# Patient Record
Sex: MALE | Race: WHITE | NOT HISPANIC OR LATINO | Employment: FULL TIME | URBAN - METROPOLITAN AREA
[De-identification: names, ages, dates, MRNs, and addresses within clinical notes are randomized per-mention and may not be internally consistent; named-entity substitution may affect disease eponyms.]

---

## 2017-12-11 ENCOUNTER — OFFICE VISIT (OUTPATIENT)
Dept: URGENT CARE | Facility: PHYSICIAN GROUP | Age: 42
End: 2017-12-11
Payer: COMMERCIAL

## 2017-12-11 VITALS
HEIGHT: 72 IN | DIASTOLIC BLOOD PRESSURE: 90 MMHG | HEART RATE: 89 BPM | WEIGHT: 201 LBS | OXYGEN SATURATION: 95 % | BODY MASS INDEX: 27.22 KG/M2 | TEMPERATURE: 98.6 F | SYSTOLIC BLOOD PRESSURE: 120 MMHG | RESPIRATION RATE: 18 BRPM

## 2017-12-11 DIAGNOSIS — R05.8 PRODUCTIVE COUGH: ICD-10-CM

## 2017-12-11 PROCEDURE — 99214 OFFICE O/P EST MOD 30 MIN: CPT | Performed by: NURSE PRACTITIONER

## 2017-12-11 ASSESSMENT — ENCOUNTER SYMPTOMS
SORE THROAT: 0
CHILLS: 0
COUGH: 1
WEAKNESS: 1
NAUSEA: 0
DIARRHEA: 0
VOMITING: 0
FEVER: 0
WHEEZING: 0
MYALGIAS: 0
SPUTUM PRODUCTION: 1
RHINORRHEA: 0
SHORTNESS OF BREATH: 0

## 2017-12-11 ASSESSMENT — COPD QUESTIONNAIRES: COPD: 0

## 2017-12-12 NOTE — PROGRESS NOTES
Subjective:      Donovan Galeas is a 42 y.o. male who presents with Cough (congestion x 2 days)            Medications, Allergies and Prior Medical Hx reviewed and updated in [facility].with patient/family today           Cough   This is a new problem. The current episode started in the past 7 days (2 days). The problem has been gradually worsening. The cough is productive of sputum. Pertinent negatives include no chills, ear pain, fever, myalgias, rhinorrhea, sore throat, shortness of breath or wheezing. Nothing aggravates the symptoms. He has tried OTC cough suppressant for the symptoms. The treatment provided no relief. There is no history of asthma, COPD or emphysema.       Review of Systems   Constitutional: Positive for malaise/fatigue. Negative for chills and fever.   HENT: Negative for congestion, ear discharge, ear pain, rhinorrhea and sore throat.    Respiratory: Positive for cough and sputum production. Negative for shortness of breath and wheezing.    Gastrointestinal: Negative for diarrhea, nausea and vomiting.   Musculoskeletal: Negative for myalgias.   Neurological: Positive for weakness.          Objective:     /90   Pulse 89   Temp 37 °C (98.6 °F)   Resp 18   Ht 1.829 m (6')   Wt 91.2 kg (201 lb)   SpO2 95%   BMI 27.26 kg/m²      Physical Exam   Constitutional: He appears well-developed and well-nourished. No distress.   HENT:   Head: Normocephalic and atraumatic.   Right Ear: Tympanic membrane and ear canal normal.   Left Ear: Tympanic membrane and ear canal normal.   Nose: No rhinorrhea.   Mouth/Throat: Uvula is midline and mucous membranes are normal. No trismus in the jaw. No uvula swelling. Posterior oropharyngeal erythema present. No oropharyngeal exudate or posterior oropharyngeal edema.   Eyes: Conjunctivae are normal. Pupils are equal, round, and reactive to light.   Neck: Neck supple.   Cardiovascular: Normal rate, regular rhythm and normal heart sounds.    Pulmonary/Chest: Effort  normal and breath sounds normal. No respiratory distress. He has no wheezes.   Lymphadenopathy:     He has cervical adenopathy.   Neurological: He is alert.   Skin: Skin is warm and dry.   Psychiatric: He has a normal mood and affect. His behavior is normal.   Vitals reviewed.              Assessment/Plan:     1. Productive cough  Hydrocod Polst-CPM Polst ER (TUSSIONEX) 10-8 MG/5ML Suspension Extended Release       Do not drink alcohol or operate machinery with this medication  Pt reviewed on [place]  Aware,  no remarkable controlled substance prescription documentation noted      Rest, Fluids, tylenol, ibuprofen, humidified air, and otc cough meds  Pt will go to the ER for worsening or changing symptoms as discussed, follow-up with your primary care provider or return here if not improving in 7 days   Discharge instructions discussed with pt/family who verbalize understanding and agreement with poc

## 2018-04-20 ENCOUNTER — OFFICE VISIT (OUTPATIENT)
Dept: URGENT CARE | Facility: PHYSICIAN GROUP | Age: 43
End: 2018-04-20
Payer: COMMERCIAL

## 2018-04-20 VITALS
TEMPERATURE: 98.7 F | HEIGHT: 72 IN | DIASTOLIC BLOOD PRESSURE: 76 MMHG | BODY MASS INDEX: 26.14 KG/M2 | HEART RATE: 79 BPM | OXYGEN SATURATION: 97 % | RESPIRATION RATE: 16 BRPM | SYSTOLIC BLOOD PRESSURE: 124 MMHG | WEIGHT: 193 LBS

## 2018-04-20 DIAGNOSIS — J06.9 VIRAL URI WITH COUGH: ICD-10-CM

## 2018-04-20 PROCEDURE — 99214 OFFICE O/P EST MOD 30 MIN: CPT | Performed by: PHYSICIAN ASSISTANT

## 2018-04-20 RX ORDER — CODEINE PHOSPHATE AND GUAIFENESIN 10; 100 MG/5ML; MG/5ML
5 SOLUTION ORAL EVERY 4 HOURS PRN
Qty: 120 ML | Refills: 0 | Status: SHIPPED | OUTPATIENT
Start: 2018-04-20 | End: 2018-04-25

## 2018-04-20 ASSESSMENT — ENCOUNTER SYMPTOMS
RHINORRHEA: 1
CARDIOVASCULAR NEGATIVE: 1
NEUROLOGICAL NEGATIVE: 1
WHEEZING: 0
SHORTNESS OF BREATH: 0
FEVER: 0
SINUS PAIN: 0
COUGH: 1
MUSCULOSKELETAL NEGATIVE: 1
CHILLS: 0
GASTROINTESTINAL NEGATIVE: 1
SORE THROAT: 0
SPUTUM PRODUCTION: 1

## 2018-04-20 NOTE — PROGRESS NOTES
Subjective:      Donovan Galeas is a 42 y.o. male who presents with Cough (with sore throat and congestion, not sleeping due to cough x 1 week)            Cough   This is a new problem. The problem has been gradually worsening. The problem occurs every few minutes. The cough is productive of sputum. Associated symptoms include nasal congestion and rhinorrhea. Pertinent negatives include no chills, ear pain, fever, postnasal drip, sore throat, shortness of breath or wheezing. The symptoms are aggravated by lying down. He has tried OTC cough suppressant for the symptoms. The treatment provided mild relief. His past medical history is significant for environmental allergies. There is no history of asthma, bronchitis or pneumonia.       PMH:  has a past medical history of Kidney calculi and Plantar fasciitis.  MEDS:   Current Outpatient Prescriptions:   •  FENOFIBRATE PO, Take  by mouth., Disp: , Rfl:   •  Hydrocod Polst-CPM Polst ER (TUSSIONEX) 10-8 MG/5ML Suspension Extended Release, Take 5 mL by mouth every 12 hours., Disp: 120 mL, Rfl: 0  •  azithromycin (ZITHROMAX) 250 MG Tab, Take as directed, Disp: 6 Tab, Rfl: 0  •  azithromycin (ZITHROMAX) 250 MG Tab, Take as directed on package. 1 pack, Disp: 6 Tab, Rfl: 0  •  azithromycin (ZITHROMAX) 250 MG TABS, 2 tabs by mouth day 1, 1 tab by mouth days 2-5, Disp: 6 Tab, Rfl: 0  •  albuterol (VENTOLIN OR PROVENTIL) 108 (90 BASE) MCG/ACT AERS, Inhale 2 Puffs by mouth every four hours as needed for Shortness of Breath., Disp: 1 Inhaler, Rfl: 0  •  hydrocod polst-chlorphen polst (TUSSIONEX PENNKINETIC ER) 10-8 MG/5ML LQCR, Take 5 mL by mouth every 12 hours., Disp: 120 mL, Rfl: 0  •  METHOCARBAMOL PO, Take  by mouth.  , Disp: , Rfl:   •  Hydrocodone-Acetaminophen (VICODIN PO), Take  by mouth.  , Disp: , Rfl:   •  NON SPECIFIED, Antibiotic for kidney stone , Disp: , Rfl:   ALLERGIES: No Known Allergies  SURGHX: No past surgical history on file.  SOCHX:  reports that he has never  smoked. He has never used smokeless tobacco.  FH: family history is not on file.      Review of Systems   Constitutional: Negative for chills and fever.   HENT: Positive for congestion and rhinorrhea. Negative for ear pain, postnasal drip, sinus pain and sore throat.    Respiratory: Positive for cough and sputum production. Negative for shortness of breath and wheezing.    Cardiovascular: Negative.    Gastrointestinal: Negative.    Musculoskeletal: Negative.    Neurological: Negative.    Endo/Heme/Allergies: Positive for environmental allergies.       Medications, Allergies, and current problem list reviewed today in Epic     Objective:     /76   Pulse 79   Temp 37.1 °C (98.7 °F)   Resp 16   Ht 1.829 m (6')   Wt 87.5 kg (193 lb)   SpO2 97%   BMI 26.18 kg/m²      Physical Exam   Constitutional: He is oriented to person, place, and time. He appears well-developed and well-nourished. No distress.   HENT:   Head: Normocephalic and atraumatic.   Right Ear: Tympanic membrane and external ear normal.   Left Ear: Tympanic membrane and external ear normal.   Nose: Nose normal.   Mouth/Throat: Oropharynx is clear and moist. No oropharyngeal exudate.   Eyes: Conjunctivae and EOM are normal. Pupils are equal, round, and reactive to light. Right eye exhibits no discharge. Left eye exhibits no discharge.   Neck: Normal range of motion. Neck supple.   Cardiovascular: Normal rate, regular rhythm and normal heart sounds.    No murmur heard.  Pulmonary/Chest: Effort normal and breath sounds normal. No respiratory distress. He has no wheezes. He exhibits no tenderness.   Lymphadenopathy:     He has no cervical adenopathy.   Neurological: He is alert and oriented to person, place, and time.   Skin: Skin is warm and dry. He is not diaphoretic.   Psychiatric: He has a normal mood and affect. His behavior is normal. Judgment and thought content normal.   Nursing note and vitals reviewed.              Assessment/Plan:     1.  Viral URI with cough  guaifenesin-codeine (ROBITUSSIN AC) Solution oral solution     Persistent nighttime cough. Not sleeping well. No signs bacterial infection, PO2 adequate.  [facility] Aware web site evaluation: I have obtained and reviewed patient utilization report from [facility] pharmacy database prior to writing prescription for controlled substance II, III or IV. Based on the report and my clinical assessment the prescription is medically necessary.   Patient is cautioned on sedation potential of narcotic medication; no drinking, driving or operating heavy machinery while on this medication.  OTC meds and conservative measures as discussed    Return to clinic or go to ED if symptoms worsen or persist. Indications for ED discussed at length. Patient voices understanding. Follow-up with your primary care provider in 3-5 days. Red flags discussed. All side effects of medication discussed including allergic response, GI upset, tendon injury, etc.    Please note that this dictation was created using voice recognition software. I have made every reasonable attempt to correct obvious errors, but I expect that there are errors of grammar and possibly content that I did not discover before finalizing the note.

## 2018-10-18 ENCOUNTER — APPOINTMENT (OUTPATIENT)
Dept: ADMISSIONS | Facility: MEDICAL CENTER | Age: 43
End: 2018-10-18
Payer: COMMERCIAL

## 2018-10-19 DIAGNOSIS — Z01.812 PRE-OPERATIVE LABORATORY EXAMINATION: ICD-10-CM

## 2018-10-19 LAB
ANION GAP SERPL CALC-SCNC: 8 MMOL/L (ref 0–11.9)
BUN SERPL-MCNC: 25 MG/DL (ref 8–22)
CALCIUM SERPL-MCNC: 10.2 MG/DL (ref 8.5–10.5)
CHLORIDE SERPL-SCNC: 103 MMOL/L (ref 96–112)
CO2 SERPL-SCNC: 28 MMOL/L (ref 20–33)
CREAT SERPL-MCNC: 1.23 MG/DL (ref 0.5–1.4)
GLUCOSE SERPL-MCNC: 95 MG/DL (ref 65–99)
POTASSIUM SERPL-SCNC: 4.2 MMOL/L (ref 3.6–5.5)
SODIUM SERPL-SCNC: 139 MMOL/L (ref 135–145)

## 2018-10-19 PROCEDURE — 80048 BASIC METABOLIC PNL TOTAL CA: CPT

## 2018-10-19 PROCEDURE — 36415 COLL VENOUS BLD VENIPUNCTURE: CPT

## 2018-10-19 RX ORDER — CHLORAL HYDRATE 500 MG
1000 CAPSULE ORAL DAILY
Status: ON HOLD | COMMUNITY
End: 2018-10-26

## 2018-10-19 RX ORDER — MULTIVIT WITH MINERALS/LUTEIN
1 TABLET ORAL DAILY
COMMUNITY
End: 2021-07-06

## 2018-10-19 RX ORDER — CYANOCOBALAMIN (VITAMIN B-12) 5000 MCG
1 TABLET,DISINTEGRATING ORAL DAILY
COMMUNITY
End: 2021-07-06

## 2018-10-19 RX ORDER — ANTIARTHRITIC COMBINATION NO.2 900 MG
25 TABLET ORAL DAILY
COMMUNITY
End: 2021-07-06

## 2018-10-19 RX ORDER — FENOFIBRATE 160 MG/1
160 TABLET ORAL DAILY
COMMUNITY

## 2018-10-19 RX ORDER — HYDROCORTISONE 5 MG/1
5 TABLET ORAL DAILY
COMMUNITY
End: 2021-07-06

## 2018-10-19 RX ORDER — TESTOSTERONE CYPIONATE 200 MG/ML
150 INJECTION, SOLUTION INTRAMUSCULAR ONCE
COMMUNITY

## 2018-10-19 NOTE — OR NURSING
"Pre-admit appointment completed. \"Preparing for your procedure\" sheet given to pt along with verbal and written instructions. Pt instructed to continue regularly prescribed medications through the day before surgery. Pt instructed to take the following medications the day of surgery with a sip of water, per anesthesia protocol; hydrocortisone and thyroid.  "

## 2018-10-26 ENCOUNTER — HOSPITAL ENCOUNTER (OUTPATIENT)
Facility: MEDICAL CENTER | Age: 43
End: 2018-10-26
Attending: OTOLARYNGOLOGY | Admitting: OTOLARYNGOLOGY
Payer: COMMERCIAL

## 2018-10-26 VITALS
HEIGHT: 72 IN | SYSTOLIC BLOOD PRESSURE: 124 MMHG | BODY MASS INDEX: 26.9 KG/M2 | WEIGHT: 198.63 LBS | HEART RATE: 70 BPM | DIASTOLIC BLOOD PRESSURE: 76 MMHG | RESPIRATION RATE: 16 BRPM | OXYGEN SATURATION: 96 % | TEMPERATURE: 97.2 F

## 2018-10-26 DIAGNOSIS — Z90.89 POST-TONSILLECTOMY PAIN: ICD-10-CM

## 2018-10-26 DIAGNOSIS — G89.18 POST-TONSILLECTOMY PAIN: ICD-10-CM

## 2018-10-26 LAB — PATHOLOGY CONSULT NOTE: NORMAL

## 2018-10-26 PROCEDURE — 700102 HCHG RX REV CODE 250 W/ 637 OVERRIDE(OP): Performed by: ANESTHESIOLOGY

## 2018-10-26 PROCEDURE — 501424 HCHG SPONGE, TONSIL: Performed by: OTOLARYNGOLOGY

## 2018-10-26 PROCEDURE — 700111 HCHG RX REV CODE 636 W/ 250 OVERRIDE (IP)

## 2018-10-26 PROCEDURE — A9270 NON-COVERED ITEM OR SERVICE: HCPCS | Performed by: ANESTHESIOLOGY

## 2018-10-26 PROCEDURE — 160036 HCHG PACU - EA ADDL 30 MINS PHASE I: Performed by: OTOLARYNGOLOGY

## 2018-10-26 PROCEDURE — 700101 HCHG RX REV CODE 250

## 2018-10-26 PROCEDURE — 88304 TISSUE EXAM BY PATHOLOGIST: CPT

## 2018-10-26 PROCEDURE — 160025 RECOVERY II MINUTES (STATS): Performed by: OTOLARYNGOLOGY

## 2018-10-26 PROCEDURE — 160002 HCHG RECOVERY MINUTES (STAT): Performed by: OTOLARYNGOLOGY

## 2018-10-26 PROCEDURE — 501838 HCHG SUTURE GENERAL: Performed by: OTOLARYNGOLOGY

## 2018-10-26 PROCEDURE — 160027 HCHG SURGERY MINUTES - 1ST 30 MINS LEVEL 2: Performed by: OTOLARYNGOLOGY

## 2018-10-26 PROCEDURE — 160048 HCHG OR STATISTICAL LEVEL 1-5: Performed by: OTOLARYNGOLOGY

## 2018-10-26 PROCEDURE — 160046 HCHG PACU - 1ST 60 MINS PHASE II: Performed by: OTOLARYNGOLOGY

## 2018-10-26 PROCEDURE — 160009 HCHG ANES TIME/MIN: Performed by: OTOLARYNGOLOGY

## 2018-10-26 PROCEDURE — 500257: Performed by: OTOLARYNGOLOGY

## 2018-10-26 PROCEDURE — 160035 HCHG PACU - 1ST 60 MINS PHASE I: Performed by: OTOLARYNGOLOGY

## 2018-10-26 PROCEDURE — 700101 HCHG RX REV CODE 250: Performed by: OTOLARYNGOLOGY

## 2018-10-26 RX ORDER — DIPHENHYDRAMINE HYDROCHLORIDE 50 MG/ML
12.5 INJECTION INTRAMUSCULAR; INTRAVENOUS
Status: DISCONTINUED | OUTPATIENT
Start: 2018-10-26 | End: 2018-10-26 | Stop reason: HOSPADM

## 2018-10-26 RX ORDER — HYDROMORPHONE HYDROCHLORIDE 2 MG/ML
0.2 INJECTION, SOLUTION INTRAMUSCULAR; INTRAVENOUS; SUBCUTANEOUS
Status: DISCONTINUED | OUTPATIENT
Start: 2018-10-26 | End: 2018-10-26 | Stop reason: HOSPADM

## 2018-10-26 RX ORDER — OXYCODONE AND ACETAMINOPHEN 10; 325 MG/1; MG/1
1 TABLET ORAL EVERY 4 HOURS PRN
Qty: 40 TAB | Refills: 0 | Status: SHIPPED | OUTPATIENT
Start: 2018-10-26 | End: 2018-11-05

## 2018-10-26 RX ORDER — HYDROMORPHONE HYDROCHLORIDE 2 MG/ML
0.4 INJECTION, SOLUTION INTRAMUSCULAR; INTRAVENOUS; SUBCUTANEOUS
Status: DISCONTINUED | OUTPATIENT
Start: 2018-10-26 | End: 2018-10-26 | Stop reason: HOSPADM

## 2018-10-26 RX ORDER — BUPIVACAINE HYDROCHLORIDE AND EPINEPHRINE 2.5; 5 MG/ML; UG/ML
INJECTION, SOLUTION EPIDURAL; INFILTRATION; INTRACAUDAL; PERINEURAL
Status: DISCONTINUED | OUTPATIENT
Start: 2018-10-26 | End: 2018-10-26 | Stop reason: HOSPADM

## 2018-10-26 RX ORDER — ONDANSETRON 2 MG/ML
4 INJECTION INTRAMUSCULAR; INTRAVENOUS EVERY 4 HOURS PRN
Status: DISCONTINUED | OUTPATIENT
Start: 2018-10-26 | End: 2018-10-26 | Stop reason: HOSPADM

## 2018-10-26 RX ORDER — CEFDINIR 250 MG/5ML
300 POWDER, FOR SUSPENSION ORAL 2 TIMES DAILY
Qty: 84 ML | Refills: 0 | Status: SHIPPED | OUTPATIENT
Start: 2018-10-26 | End: 2021-07-06

## 2018-10-26 RX ORDER — HYDROMORPHONE HYDROCHLORIDE 2 MG/ML
0.1 INJECTION, SOLUTION INTRAMUSCULAR; INTRAVENOUS; SUBCUTANEOUS
Status: DISCONTINUED | OUTPATIENT
Start: 2018-10-26 | End: 2018-10-26 | Stop reason: HOSPADM

## 2018-10-26 RX ORDER — OXYCODONE HYDROCHLORIDE 10 MG/1
10 TABLET ORAL
Status: DISCONTINUED | OUTPATIENT
Start: 2018-10-26 | End: 2018-10-26 | Stop reason: HOSPADM

## 2018-10-26 RX ORDER — OXYCODONE HCL 5 MG/5 ML
10 SOLUTION, ORAL ORAL
Status: COMPLETED | OUTPATIENT
Start: 2018-10-26 | End: 2018-10-26

## 2018-10-26 RX ORDER — SODIUM CHLORIDE, SODIUM LACTATE, POTASSIUM CHLORIDE, CALCIUM CHLORIDE 600; 310; 30; 20 MG/100ML; MG/100ML; MG/100ML; MG/100ML
INJECTION, SOLUTION INTRAVENOUS CONTINUOUS
Status: DISCONTINUED | OUTPATIENT
Start: 2018-10-26 | End: 2018-10-26 | Stop reason: HOSPADM

## 2018-10-26 RX ORDER — ONDANSETRON 2 MG/ML
4 INJECTION INTRAMUSCULAR; INTRAVENOUS
Status: DISCONTINUED | OUTPATIENT
Start: 2018-10-26 | End: 2018-10-26 | Stop reason: HOSPADM

## 2018-10-26 RX ORDER — OXYCODONE HCL 5 MG/5 ML
5 SOLUTION, ORAL ORAL
Status: COMPLETED | OUTPATIENT
Start: 2018-10-26 | End: 2018-10-26

## 2018-10-26 RX ORDER — HALOPERIDOL 5 MG/ML
1 INJECTION INTRAMUSCULAR
Status: DISCONTINUED | OUTPATIENT
Start: 2018-10-26 | End: 2018-10-26 | Stop reason: HOSPADM

## 2018-10-26 RX ORDER — SODIUM CHLORIDE, SODIUM LACTATE, POTASSIUM CHLORIDE, CALCIUM CHLORIDE 600; 310; 30; 20 MG/100ML; MG/100ML; MG/100ML; MG/100ML
INJECTION, SOLUTION INTRAVENOUS ONCE
Status: COMPLETED | OUTPATIENT
Start: 2018-10-26 | End: 2018-10-26

## 2018-10-26 RX ORDER — OXYCODONE HYDROCHLORIDE 5 MG/1
5 TABLET ORAL
Status: DISCONTINUED | OUTPATIENT
Start: 2018-10-26 | End: 2018-10-26 | Stop reason: HOSPADM

## 2018-10-26 RX ADMIN — LIDOCAINE HYDROCHLORIDE 0.5 ML: 10 INJECTION, SOLUTION INFILTRATION; PERINEURAL at 06:30

## 2018-10-26 RX ADMIN — OXYCODONE HYDROCHLORIDE 10 MG: 5 SOLUTION ORAL at 08:49

## 2018-10-26 RX ADMIN — SODIUM CHLORIDE, SODIUM LACTATE, POTASSIUM CHLORIDE, CALCIUM CHLORIDE: 600; 310; 30; 20 INJECTION, SOLUTION INTRAVENOUS at 06:30

## 2018-10-26 ASSESSMENT — PAIN SCALES - GENERAL
PAINLEVEL_OUTOF10: ASSUMED PAIN PRESENT
PAINLEVEL_OUTOF10: ASSUMED PAIN PRESENT
PAINLEVEL_OUTOF10: 4
PAINLEVEL_OUTOF10: ASSUMED PAIN PRESENT
PAINLEVEL_OUTOF10: 4

## 2018-10-26 NOTE — OR NURSING
"1639: Pt's SO, Vale, called related to pt feeling increased anxiety and his feeling that he is having more swelling of his uvula, and that he has a hard time breathing when he lays down. They did say they had called Dr Sy's office \"right before we called you\". Told Vale that he should not lay down, unless he was propped up on 3-4 pillows or was in a recliner, related that airway obstruction is more common if lying supine, explained about tongue muscle relaxing and obstructing airway. Had her check his cap refill, it was \"less than 1 second\". Explained that was a good sign for oxygenation, that if his O2 level was low his finger would not pink back up, she stated that his finger nail beds were not blue. Told her to wait another 30-45 minutes for a call back from Dr Sy's office, that someone was indeed on call, but it might take a little longer for a response related to the holiday. I did tell her that if he stopped breathing, or if his breathing became worse she should call 911. I also told her to call us back if she had not heard from Dr Sy's within 45 minutes.  "

## 2018-10-26 NOTE — OR NURSING
0822 Report from Michelle HARKINS to assume care of patient.  0840 Patient complains of some pain to throat.   0849 Patient medicated orally for pain.  0855 Patients family notified of patients disposition.  0905 Report to Homer HARKINS to assume care of patient.

## 2018-10-26 NOTE — OP REPORT
DATE OF SERVICE:  10/26/2018    PREOPERATIVE DIAGNOSIS:  Tonsillitis.    POSTOPERATIVE DIAGNOSIS:  Tonsillitis.    OPERATION PERFORMED:  Tonsillectomy.    SURGEON:  Rochelle Sy MD    ANESTHESIOLOGIST:  Antwon Arguelles MD    ANESTHESIA:  General endotracheal.    NARRATIVE:  After meeting the patient in preoperative holding area, discussing   risks, benefits, complications, and alternatives, patient was then taken to   the operating room and placed under satisfactory general anesthesia.  Once   that was achieved, eyes were taped shut, head drapes applied, and head of bed   was rotated left 90 degrees.  McIvor mouth gag with L3 handle was then placed   in the oral cavity and palate palpated for any submucous clefting or   diastasis, none was appreciated.  The right tonsil was grasped, retracted   medially, and dissected out of its fossa preserving the palatoglossus and   palatopharyngeus.  After that was completed, any additional bleeding, suction   cautery was used to stop it.  Identical procedure was carried out on the left   hand side.  Once that was achieved, red Ann catheter was intubated on the   left and right naris through the soft palate to suspend it.  There was no   adenoid pad visualization of the torus tubarius bilaterally was seen.  He was   then de-suspended and looked for any evidence of any bleeding, none was seen.    So, he was injected with 0.25% Marcaine with epinephrine 1:200,000,   approximately 1.2 mL and then 2 interrupted 4-0 Vicryls were then used at the   apices of the tonsils in both sides.  The patient was suctioned out,   hypopharyngeal pack was removed.  The patient was awakened, extubated, and   transferred to recovery.    ESTIMATED BLOOD LOSS:  20 mL.       ____________________________________     MD RAIN FRANCOIS / NTS    DD:  10/26/2018 11:16:48  DT:  10/26/2018 12:17:34    D#:  [id number]  Job#:  [id number]

## 2018-10-26 NOTE — OR NURSING
0748 To PACU from OR via gurney, sleeping, respirations spontaneous and non-labored via OPA. VSS.   0800 No change. VSS   0805 OPA dc'd at this time. VSS. Pt denies pain and nausea.   0815 VSS. Pt sleeping. Report to TORIN Pelaez who assumed care of pt.

## 2018-10-26 NOTE — DISCHARGE INSTRUCTIONS
ACTIVITY: Rest and take it easy for the first 24 hours.  A responsible adult is recommended to remain with you during that time.  It is normal to feel sleepy.  We encourage you to not do anything that requires balance, judgment or coordination.    MILD FLU-LIKE SYMPTOMS ARE NORMAL. YOU MAY EXPERIENCE GENERALIZED MUSCLE ACHES, THROAT IRRITATION, HEADACHE AND/OR SOME NAUSEA.    FOR 24 HOURS DO NOT:  Drive, operate machinery or run household appliances.  Drink beer or alcoholic beverages.   Make important decisions or sign legal documents.    SPECIAL INSTRUCTIONS: Sleep/rest with head elevated at least 15-30 degrees (ie well propped up with pillows in bed or in recliner chair)    DIET: To avoid nausea, slowly advance diet as tolerated, avoiding spicy or greasy foods for the first day.  Add more substantial food to your diet according to your physician's instructions.  Babies can be fed formula or breast milk as soon as they are hungry.  INCREASE FLUIDS AND FIBER TO AVOID CONSTIPATION.        FOLLOW-UP APPOINTMENT:  A follow-up appointment should be arranged with your doctor; call to schedule.    You should CALL YOUR PHYSICIAN if you develop:  Fever greater than 101 degrees F.  Pain not relieved by medication, or persistent nausea or vomiting.  Excessive bleeding (blood soaking through dressing) or unexpected drainage from the wound.  Extreme redness or swelling around the incision site, drainage of pus or foul smelling drainage.  Inability to urinate or empty your bladder within 8 hours.  Problems with breathing or chest pain.    You should call 911 if you develop problems with breathing or chest pain.  If you are unable to contact your doctor or surgical center, you should go to the nearest emergency room or urgent care center.  Physician's telephone #: [phone]    If any questions arise, call your doctor.  If your doctor is not available, please feel free to call the Surgical Center at [phone].  The Center is  open Monday through Friday from 7AM to 7PM.  You can also call the HEALTH HOTLINE open 24 hours/day, 7 days/week and speak to a nurse at [phone], or toll free at [phone].    A registered nurse may call you a few days after your surgery to see how you are doing after your procedure.    MEDICATIONS: Resume taking daily medication.  Take prescribed pain medication with food.  If no medication is prescribed, you may take non-aspirin pain medication if needed.  PAIN MEDICATION CAN BE VERY CONSTIPATING.  Take a stool softener or laxative such as senokot, pericolace, or milk of magnesia if needed.    Prescription given for Ominicef and Percocet.  Last pain medication given at 0850 AM..    If your physician has prescribed pain medication that includes Acetaminophen (Tylenol), do not take additional Acetaminophen (Tylenol) while taking the prescribed medication.    Depression / Suicide Risk    As you are discharged from this [facility] facility, it is important to learn how to keep safe from harming yourself.    Recognize the warning signs:  · Abrupt changes in personality, positive or negative- including increase in energy   · Giving away possessions  · Change in eating patterns- significant weight changes-  positive or negative  · Change in sleeping patterns- unable to sleep or sleeping all the time   · Unwillingness or inability to communicate  · Depression  · Unusual sadness, discouragement and loneliness  · Talk of wanting to die  · Neglect of personal appearance   · Rebelliousness- reckless behavior  · Withdrawal from people/activities they love  · Confusion- inability to concentrate     If you or a loved one observes any of these behaviors or has concerns about self-harm, here's what you can do:  · Talk about it- your feelings and reasons for harming yourself  · Remove any means that you might use to hurt yourself (examples: pills, rope, extension cords, firearm)  · Get professional help from the  community (Mental Health, Substance Abuse, psychological counseling)  · Do not be alone:Call your Safe Contact- someone whom you trust who will be there for you.  · Call your local CRISIS HOTLINE 094-5846 or 554-219-0656  · Call your local Children's Mobile Crisis Response Team [place] (470) 747-4326 or www.Ujogo  · Call the toll free National Suicide Prevention Hotlines   · National Suicide Prevention Lifeline 637-147-LTAI (9582)  · National Hope Line Network 800-SUICIDE (485-1379)

## 2018-10-26 NOTE — OR NURSING
0910- Patient sitting to chair from stage I area. No distress noted. Patient denies the need for pain intervention at this time.    0935- discharge paperwork and instructions discussed with patient and family with both stating that discharge instructions understood.     0940- Patient discharged to awaiting vehicle via wheelchair.

## 2019-11-21 ENCOUNTER — OFFICE VISIT (OUTPATIENT)
Dept: URGENT CARE | Facility: PHYSICIAN GROUP | Age: 44
End: 2019-11-21
Payer: COMMERCIAL

## 2019-11-21 VITALS
WEIGHT: 190 LBS | TEMPERATURE: 98.5 F | RESPIRATION RATE: 18 BRPM | SYSTOLIC BLOOD PRESSURE: 124 MMHG | HEIGHT: 72 IN | DIASTOLIC BLOOD PRESSURE: 82 MMHG | OXYGEN SATURATION: 95 % | HEART RATE: 91 BPM | BODY MASS INDEX: 25.73 KG/M2

## 2019-11-21 DIAGNOSIS — J06.9 URI WITH COUGH AND CONGESTION: ICD-10-CM

## 2019-11-21 DIAGNOSIS — R05.9 COUGH: ICD-10-CM

## 2019-11-21 DIAGNOSIS — R50.9 FEVER, UNSPECIFIED FEVER CAUSE: ICD-10-CM

## 2019-11-21 LAB
FLUAV+FLUBV AG SPEC QL IA: NEGATIVE
INT CON NEG: NEGATIVE
INT CON POS: POSITIVE

## 2019-11-21 PROCEDURE — 87804 INFLUENZA ASSAY W/OPTIC: CPT | Performed by: NURSE PRACTITIONER

## 2019-11-21 PROCEDURE — 99214 OFFICE O/P EST MOD 30 MIN: CPT | Performed by: NURSE PRACTITIONER

## 2019-11-21 RX ORDER — AMOXICILLIN AND CLAVULANATE POTASSIUM 875; 125 MG/1; MG/1
1 TABLET, FILM COATED ORAL 2 TIMES DAILY
Qty: 14 TAB | Refills: 0 | Status: SHIPPED | OUTPATIENT
Start: 2019-11-21 | End: 2019-11-28

## 2019-11-21 ASSESSMENT — ENCOUNTER SYMPTOMS
FEVER: 1
SHORTNESS OF BREATH: 0
SORE THROAT: 0
CONSTIPATION: 0
DIARRHEA: 0
SPUTUM PRODUCTION: 0
DIZZINESS: 0
ABDOMINAL PAIN: 0
NAUSEA: 0
WHEEZING: 0
WEAKNESS: 0
HEADACHES: 0
VOMITING: 0
CHILLS: 1
SENSORY CHANGE: 0
BACK PAIN: 0
MYALGIAS: 1
SINUS PAIN: 0
ORTHOPNEA: 0
TINGLING: 0
COUGH: 1
PALPITATIONS: 0

## 2019-11-21 NOTE — PROGRESS NOTES
Subjective:      Donovan Galeas is a 44 y.o. male who presents with Cough (fever, sore throat x3days)            HPI  Cough- dry, sore throat. Works for [employer]. Fatigued. Theraflu, cough drops, cough med that is homeopathic x 2 days. Subjective fever. Denies smoking, asthma. Denies SOB, chest tightness, wheeze. No flu vaccine. Vit C, Vit B injections yesterday.     PMH:  has a past medical history of Cold (10/12/2018), Disorder of thyroid, High cholesterol, Plantar fasciitis, and Seasonal allergies.  MEDS:   Current Outpatient Medications:   •  cefdinir (OMNICEF) 250 MG/5ML suspension, Take 6 mL by mouth 2 times a day., Disp: 84 mL, Rfl: 0  •  testosterone cypionate (DEPO-TESTOSTERONE) 200 MG/ML Solution injection, 150 mg by Intramuscular route Once., Disp: , Rfl:   •  Cyanocobalamin (VITAMIN B-12) 5000 MCG TABLET DISPERSIBLE, Take 1 Tab by mouth every day., Disp: , Rfl:   •  MAGNESIUM GLUCONATE PO, Take 165 mg by mouth 2 Times a Day., Disp: , Rfl:   •  Glucosamine-Chondroit-Vit C-Mn (GLUCOSAMINE CHONDR 1500 COMPLX PO), Take 1 Tab by mouth 2 Times a Day., Disp: , Rfl:   •  fenofibrate (TRIGLIDE) 160 MG tablet, Take 160 mg by mouth every day., Disp: , Rfl:   •  Ascorbic Acid (VITAMIN C) 1000 MG Tab, Take 1 Tab by mouth every day., Disp: , Rfl:   •  DHEA 25 MG Tab, Take 25 mg by mouth every day., Disp: , Rfl:   •  Thyroid 97.5 MG Tab, Take 1 Tab by mouth every day., Disp: , Rfl:   •  hydrocortisone (CORTEF) 5 MG Tab, Take 5 mg by mouth every day. Prescribed by doctor to increase energy by Dr Kearns., Disp: , Rfl:   •  Non Formulary Request, Take 4 Drops by mouth every day. ALLERGY DROPS, Disp: , Rfl:   ALLERGIES:   Allergies   Allergen Reactions   • Seasonal Runny Nose and Itching     Sneezing.  Had allergy testing done.     SURGHX:   Past Surgical History:   Procedure Laterality Date   • TONSILLECTOMY AND ADENOIDECTOMY Bilateral 10/26/2018    Procedure: TONSILLECTOMY AND ADENOIDECTOMY;  Surgeon: Rochelle Sy,  M.D.;  Location: SURGERY [facility];  Service: Ent   • DESTRUCT,NEUROLYTIC AGNT,OTHER  2012    T5-T8 nerve ablation   • RELEASE PLANTAR FASCIA Bilateral 2006   • DENTAL EXTRACTION(S)  1993    wisdom teeth     SOCHX:  reports that he has quit smoking. His smoking use included cigars. He smoked 0.00 packs per day for 2.00 years. He has never used smokeless tobacco. He reports current alcohol use. He reports that he does not use drugs.  FH: Family history was reviewed, no pertinent findings to report    Review of Systems   Constitutional: Positive for chills, fever and malaise/fatigue.   HENT: Positive for congestion. Negative for ear pain, sinus pain and sore throat.    Respiratory: Positive for cough. Negative for sputum production, shortness of breath and wheezing.    Cardiovascular: Negative for chest pain, palpitations and orthopnea.   Gastrointestinal: Negative for abdominal pain, constipation, diarrhea, nausea and vomiting.   Musculoskeletal: Positive for myalgias. Negative for back pain.   Skin: Negative for itching and rash.   Neurological: Negative for dizziness, tingling, sensory change, weakness and headaches.   Endo/Heme/Allergies: Negative for environmental allergies.   All other systems reviewed and are negative.         Objective:     /82 (BP Location: Left arm, Patient Position: Sitting, BP Cuff Size: Adult)   Pulse 91   Temp 36.9 °C (98.5 °F) (Temporal)   Resp 18   Ht 1.829 m (6')   Wt 86.2 kg (190 lb)   SpO2 95%   BMI 25.77 kg/m²      Physical Exam  Vitals signs reviewed.   Constitutional:       General: He is not in acute distress.     Appearance: Normal appearance. He is well-developed. He is not ill-appearing, toxic-appearing or diaphoretic.   HENT:      Head: Normocephalic.      Right Ear: Ear canal and external ear normal. A middle ear effusion is present.      Left Ear: Tympanic membrane, ear canal and external ear normal.      Nose: Mucosal edema, congestion and rhinorrhea  present. No nasal tenderness.      Right Turbinates: Enlarged and swollen.      Mouth/Throat:      Lips: Pink.      Mouth: Mucous membranes are dry.      Pharynx: Posterior oropharyngeal erythema present. No pharyngeal swelling, oropharyngeal exudate or uvula swelling.      Tonsils: Swellin on the right. 0 on the left.   Eyes:      General:         Right eye: No discharge.         Left eye: No discharge.      Conjunctiva/sclera: Conjunctivae normal.      Pupils: Pupils are equal, round, and reactive to light.   Neck:      Musculoskeletal: Normal range of motion.   Cardiovascular:      Rate and Rhythm: Normal rate and regular rhythm.   Pulmonary:      Effort: Pulmonary effort is normal. No accessory muscle usage or respiratory distress.      Breath sounds: Normal breath sounds and air entry.   Abdominal:      General: Bowel sounds are normal. There is no distension.      Palpations: Abdomen is soft.      Tenderness: There is no tenderness. There is no guarding or rebound.   Musculoskeletal: Normal range of motion.   Lymphadenopathy:      Cervical: No cervical adenopathy.   Skin:     General: Skin is warm and dry.   Neurological:      Mental Status: He is alert and oriented to person, place, and time.   Psychiatric:         Behavior: Behavior is cooperative.               Declines CXR  [facility] Aware web site evaluation: I have obtained and reviewed patient utilization report from [facility] pharmacy database prior to writing prescription for controlled substance II, III or IV. Based on the report and my clinical assessment the prescription is medically necessary        Assessment/Plan:       1. Fever, unspecified fever cause    - POCT Influenza A/B: NEG    2. Cough    - Hydrocod Polst-CPM Polst ER (TUSSIONEX) 10-8 MG/5ML Suspension Extended Release; Take 5 mL by mouth every 6 hours as needed for Cough or Rhinitis for up to 7 days. Causes drowsiness, do not drive or work while using.  Dispense: 140 mL; Refill:  0    3. URI with cough and congestion    - amoxicillin-clavulanate (AUGMENTIN) 875-125 MG Tab; Take 1 Tab by mouth 2 times a day for 7 days.  Dispense: 14 Tab; Refill: 0  - Hydrocod Polst-CPM Polst ER (TUSSIONEX) 10-8 MG/5ML Suspension Extended Release; Take 5 mL by mouth every 6 hours as needed for Cough or Rhinitis for up to 7 days. Causes drowsiness, do not drive or work while using.  Dispense: 140 mL; Refill: 0  D/c Theraflu  Increase water intake  May use Ibuprofen/Tylenol prn for fever or body aches  Get rest  May use saline nasal spray/flonase prn to flush any nasal congestion   Monitor for fevers, productive cough, SOB, CP, chest tightness- need re-evaluation

## 2021-03-04 ENCOUNTER — OFFICE VISIT (OUTPATIENT)
Dept: URGENT CARE | Facility: PHYSICIAN GROUP | Age: 46
End: 2021-03-04

## 2021-03-04 VITALS
WEIGHT: 205.6 LBS | HEART RATE: 90 BPM | BODY MASS INDEX: 27.85 KG/M2 | OXYGEN SATURATION: 98 % | TEMPERATURE: 97.8 F | HEIGHT: 72 IN | RESPIRATION RATE: 16 BRPM

## 2021-03-04 DIAGNOSIS — R81 GLUCOSURIA: ICD-10-CM

## 2021-03-04 DIAGNOSIS — Z02.4 ENCOUNTER FOR CDL (COMMERCIAL DRIVING LICENSE) EXAM: ICD-10-CM

## 2021-03-04 LAB
APPEARANCE UR: CLEAR
BILIRUB UR STRIP-MCNC: NORMAL MG/DL
COLOR UR AUTO: YELLOW
GLUCOSE BLD-MCNC: 102 MG/DL (ref 70–100)
GLUCOSE UR STRIP.AUTO-MCNC: 100 MG/DL
HBA1C MFR BLD: 4.8 % (ref 0–5.6)
INT CON NEG: NORMAL
INT CON POS: NORMAL
KETONES UR STRIP.AUTO-MCNC: NORMAL MG/DL
LEUKOCYTE ESTERASE UR QL STRIP.AUTO: NORMAL
NITRITE UR QL STRIP.AUTO: NORMAL
PH UR STRIP.AUTO: 5.5 [PH] (ref 5–8)
PROT UR QL STRIP: NORMAL MG/DL
RBC UR QL AUTO: NORMAL
SP GR UR STRIP.AUTO: 1.02
UROBILINOGEN UR STRIP-MCNC: 0.2 MG/DL

## 2021-03-04 PROCEDURE — 83036 HEMOGLOBIN GLYCOSYLATED A1C: CPT | Performed by: FAMILY MEDICINE

## 2021-03-04 PROCEDURE — 7100 PR DOT PHYSICAL: Performed by: FAMILY MEDICINE

## 2021-03-04 PROCEDURE — 81002 URINALYSIS NONAUTO W/O SCOPE: CPT | Performed by: FAMILY MEDICINE

## 2021-03-04 PROCEDURE — 82962 GLUCOSE BLOOD TEST: CPT | Performed by: FAMILY MEDICINE

## 2021-03-04 NOTE — PROGRESS NOTES
Subjective:      Donovan Galeas is a 45 y.o. male who presents with Annual Exam (DOT )  See scanned history, physical  and clearance status in EPIC    Patient denies any history of seizures, dialysis, insulin use, pacemaker/defibrillator, syncope, arrhythmias/murmurs, vertigo/meniere's disease, illicit drug use, regular sedating medication use, ETOH abuse, or any weakness/paresthesias to extremities.     Cleared:  2yrs    Renew: yes    Corrective lenses:  no          HPI    ROS       Objective:     Pulse 90   Temp 36.6 °C (97.8 °F) (Temporal)   Resp 16   Ht 1.829 m (6')   Wt 93.3 kg (205 lb 9.6 oz)   SpO2 98%   BMI 27.88 kg/m²    132/90  Physical Exam            Assessment/Plan:

## 2021-07-06 ENCOUNTER — HOSPITAL ENCOUNTER (OUTPATIENT)
Dept: RADIOLOGY | Facility: MEDICAL CENTER | Age: 46
End: 2021-07-06
Attending: PHYSICIAN ASSISTANT
Payer: COMMERCIAL

## 2021-07-06 ENCOUNTER — OCCUPATIONAL MEDICINE (OUTPATIENT)
Dept: URGENT CARE | Facility: PHYSICIAN GROUP | Age: 46
End: 2021-07-06
Payer: COMMERCIAL

## 2021-07-06 VITALS
OXYGEN SATURATION: 100 % | TEMPERATURE: 98.3 F | WEIGHT: 205 LBS | BODY MASS INDEX: 27.77 KG/M2 | SYSTOLIC BLOOD PRESSURE: 118 MMHG | HEART RATE: 96 BPM | RESPIRATION RATE: 16 BRPM | HEIGHT: 72 IN | DIASTOLIC BLOOD PRESSURE: 58 MMHG

## 2021-07-06 DIAGNOSIS — S46.911A STRAIN OF RIGHT SHOULDER, INITIAL ENCOUNTER: ICD-10-CM

## 2021-07-06 PROCEDURE — 73030 X-RAY EXAM OF SHOULDER: CPT | Mod: RT

## 2021-07-06 PROCEDURE — 99215 OFFICE O/P EST HI 40 MIN: CPT | Mod: 29 | Performed by: PHYSICIAN ASSISTANT

## 2021-07-06 NOTE — LETTER
EMPLOYEE’S CLAIM FOR COMPENSATION/ REPORT OF INITIAL TREATMENT  FORM C-4    EMPLOYEE’S CLAIM - PROVIDE ALL INFORMATION REQUESTED   First Name  Donovan Last Name  Adal Birthdate                    1975                Sex  male Claim Number   Home Address  [address] Age  46 y.o. Height  1.829 m (6') Weight  93 kg (205 lb) [facility]     [place] Zip  [address] Telephone  [phone] (home)    Mailing Address  [address] [place]  [address] Primary Language Spoken  English    Insurer   Third Party   Wanda Nova   Employee's Occupation (Job Title) When Injury or Occupational Disease Occurred      Employer's Name  [employer]  Telephone  [phone]    Employer Address  [address]  [facility]  Zip  [address]    Date of Injury  7/6/2021               Hour of Injury   Date Employer Notified  7/6/2021 Last Day of Work after Injury     or Occupational Disease  7/6/2021 Supervisor to Whom Injury     Reported  Antwon Santiago   Address or Location of Accident (if applicable)  Work [1]   What were you doing at the time of accident? (if applicable)  Estela was stuck on bmper. I pulled it up and the weight from packagince loose pulled my arm with estela    How did this injury or occupational disease occur? (Be specific an answer in detail. Use additional sheet if necessary)  I was loading the estela with package and my back bumper when I was done the estela was stuck on bumper I tried loosing up the estela by pulling up once the estela was lose the weight from packages made estela shoot  forward and pulled my  arm with it   If you believe that you have an occupational disease, when did you first have knowledge of the disability and it relationship to your employment?  N/A Witnesses to the Accident  N/A      Nature of Injury or Occupational Disease  Workers' Compensation  Part(s) of Body  Injured or Affected  Shoulder (R), Shoulder (R), Shoulder (R)    I certify that the above is true and correct to the best of my knowledge and that I have provided this information in order to obtain the benefits of [place]  I hereby authorize any physician, chiropractor, surgeon, practitioner, or other person, any hospital, including [facility] or [facility], any medical service organization, any insurance company, or other institution or organization to release to each other, any medical or other information, including benefits paid or payable, pertinent to this injury or disease, except information relative to diagnosis, treatment and/or counseling for AIDS, psychological conditions, alcohol or controlled substances, for which I must give specific authorization.  A Photostat of this authorization shall be as valid as the original.     Date   Place   Employee’s Signature   THIS REPORT MUST BE COMPLETED AND MAILED WITHIN 3 WORKING DAYS OF TREATMENT   Place  [facility]  Name of Facility  [place]   Date  7/6/2021 Diagnosis  (S46.911A) Strain of right shoulder, initial encounter Is there evidence the injured employee was under the              influence of alcohol and/or another controlled substance at the time of accident?   Hour  2:34 PM Description of Injury or Disease  The encounter diagnosis was Strain of right shoulder, initial encounter. No   Treatment  Restrictions are for the right upper extremity.  X-ray did show questionable fracture to the glenoid labrum of the right shoulder.  Patient will follow up with occupational medicine hopefully within the next couple of days for potential need of further imaging at that point.  Fully restricted with the right upper extremity.  Shoulder immobilizer given to patient today.  Recommend to wear most of the day especially while  "upright and working.  Patient can use Tylenol and ibuprofen over-the-counter per 's instructions as needed for pain and also recommend lots of rest and ice to the area.  Have you advised the patient to remain off work five days or     more? No   X-Ray Findings  Positive   If Yes   From Date  To Date      From information given by the employee, together with medical evidence, can you directly connect this injury or occupational disease as job incurred?  Yes If No Full Duty    No Modified Duty  Yes   Is additional medical care by a physician indicated?  Yes If Modified Duty, Specify any Limitations / Restrictions  No use of right upper extremity.   Do you know of any previous injury or disease contributing to this condition or occupational disease?                            Yes  Comments:previous MRI 2004, tendiopathy vs tear of supraspinatus.   Date  7/6/2021 Print Doctor’s Name   Christian Mcadams P.A.-C. I certify the employer’s copy of  this form was mailed on:   Address  [address] [place] [address]. Insurer’s Use Only     [facility]  [address]    Provider’s Tax ID Number  [id number] Telephone  Dept: [phone]      ibrahima-CHRISTIAN Carson P.A.-C.  Signature:     Degree          ORIGINAL-TREATING PHYSICIAN OR CHIROPRACTOR    PAGE 2-INSURER/TPA    PAGE 3-EMPLOYER    PAGE 4-EMPLOYEE        Form C-4 (rev.10/07)           BRIEF DESCRIPTION OF RIGHTS AND BENEFITS  (Pursuant to [place] 616C.050)    Notice of Injury or Occupational Disease (Incident Report Form C-1): If an injury or occupational disease (OD) arises out of and in the course of employment, you must provide written notice to your employer as soon as practicable, but no later than 7 days after the accident or OD. Your employer shall maintain a sufficient supply of the required forms.    Claim for Compensation (Form C-4): If medical treatment is sought, the form C-4 is available at the place of initial treatment. A completed \"Claim for Compensation\" " (Form C-4) must be filed within 90 days after an accident or OD. The treating physician or chiropractor must, within 3 working days after treatment, complete and mail to the employer, the employer's insurer and third-party , the Claim for Compensation.    Medical Treatment: If you require medical treatment for your on-the-job injury or OD, you may be required to select a physician or chiropractor from a list provided by your workers’ compensation insurer, if it has contracted with an Organization for Managed Care (MCO) or Preferred Provider Organization (PPO) or providers of health care. If your employer has not entered into a contract with an MCO or PPO, you may select a physician or chiropractor from the Panel of Physicians and Chiropractors. Any medical costs related to your industrial injury or OD will be paid by your insurer.    Temporary Total Disability (TTD): If your doctor has certified that you are unable to work for a period of at least 5 consecutive days, or 5 cumulative days in a 20-day period, or places restrictions on you that your employer does not accommodate, you may be entitled to TTD compensation.    Temporary Partial Disability (TPD): If the wage you receive upon reemployment is less than the compensation for TTD to which you are entitled, the insurer may be required to pay you TPD compensation to make up the difference. TPD can only be paid for a maximum of 24 months.    Permanent Partial Disability (PPD): When your medical condition is stable and there is an indication of a PPD as a result of your injury or OD, within 30 days, your insurer must arrange for an evaluation by a rating physician or chiropractor to determine the degree of your PPD. The amount of your PPD award depends on the date of injury, the results of the PPD evaluation, your age and wage.    Permanent Total Disability (PTD): If you are medically certified by a treating physician or chiropractor as permanently and  totally disabled and have been granted a PTD status by your insurer, you are entitled to receive monthly benefits not to exceed 66 2/3% of your average monthly wage. The amount of your PTD payments is subject to reduction if you previously received a lump-sum PPD award.    Vocational Rehabilitation Services: You may be eligible for vocational rehabilitation services if you are unable to return to the job due to a permanent physical impairment or permanent restrictions as a result of your injury or occupational disease.    Transportation and Per Velma Reimbursement: You may be eligible for travel expenses and per velma associated with medical treatment.    Reopening: You may be able to reopen your claim if your condition worsens after claim closure.     Appeal Process: If you disagree with a written determination issued by the insurer or the insurer does not respond to your request, you may appeal to the Department of Administration, , by following the instructions contained in your determination letter. You must appeal the determination within 70 days from the date of the determination letter at [address], [address], [place] [address], or [address], [place] [address]. If you disagree with the  decision, you may appeal to the Department of Administration, . You must file your appeal within 30 days from the date of the  decision letter at [address], [address], [place], [place] [address], or [address], [place] [address]. If you disagree with a decision of an , you may file a petition for judicial review with the District Court. You must do so within 30 days of the Appeal Officer’s decision. You may be represented by an  at your own expense or you may contact the [facility] for possible representation.    [place]  for Injured Workers (IW): If you  disagree with a  decision, you may request that [facility] represent you without charge at an  Hearing. For information regarding denial of benefits, you may contact the [facility] at: [address], [address], [place] [address], [phone], or [address], [address], [place] [address], [phone]    To File a Complaint with the Division: If you wish to file a complaint with the  of the Division of Industrial Relations (DIR),  please contact the Workers’ Compensation Section, [address], [address], [place], [place] [address], telephone [phone]) [phone], or [address], [address], [place], [place] [address], telephone [phone].    For assistance with Workers’ Compensation Issues: You may contact the [facility] Office for Consumer Health Assistance, [address], [address], [place], [place] [address], Toll Free [phone], Web site: http://[facility].[place].gov/Programs/CELINE E-mail: celine@[facility].nv.[facility]              __________________________________________________________________                                    _________________            Employee Name / Signature                                                                                                                            Date                                                                                                                                                                                                                              D-2 (rev. 10/20)

## 2021-07-06 NOTE — LETTER
[facility]  [address]  [place] [address]  Phone:  [phone] - Fax:  [phone]   [facility] Progress Report and Disability Certification  Date of Service: 7/6/2021   No Show:  No  Date / Time of Next Visit: 7/9/2021   Claim Information   Patient Name: Donovan Galeas  Claim Number:     Employer: [employer]  Date of Injury: 7/6/2021     Insurer / TPA: Wanda Howard  ID / SSN:     Occupation:   Diagnosis: The encounter diagnosis was Strain of right shoulder, initial encounter.    Medical Information   Related to Industrial Injury? Yes    Subjective Complaints:  DOI 7/6/2021: Patient states that he was loading the fili with packages on his back bumper and when he was done the dog was stuck on the bumper when he tried loosening the fili by pulling up but once the fili was loose the weight from the packages pulled the fili forward in his right shoulder with it.  Patient states he did take a couple of Advil with some relief.  States that the arm feels better when it is hanging down.  Patient denies any other job and no previous shoulder injury.   Objective Findings: Patient has limited range of motion in all planes with upper extremity on the right side secondary to pain.  Neurovascular intact distally and 5/5  strength.  Negative empty can test.  Tenderness to palpation to the anterior lateral and posterior portions of the right shoulder.  No step-off deformity noted.  No tenderness along the clavicle or the neck.  No midline spinous process tenderness.   Pre-Existing Condition(s):     Assessment:   Initial Visit    Status: Discharged / Care Transfer  Permanent Disability:No    Plan:      Diagnostics: X-ray    Comments:       Disability Information   Status: Released to Restricted Duty    From:  7/6/2021  Through: 7/9/2021 Restrictions are: Temporary   Physical Restrictions   Sitting:    Standing:    Stooping:    Bending:      Squatting:     Walking:    Climbin hrs/day Pushin hrs/day   Pullin hrs/day Other:    Reaching Above Shoulder (L):   Reaching Above Shoulder (R): 0 hrs/day     Reaching Below Shoulder (L):    Reaching Below Shoulder (R):  0 hrs/day   Not to exceed Weight Limits   Carrying(hrs): 0 Weight Limit(lb):   Lifting(hrs): 0 Weight  Limit(lb):     Comments: Restrictions are for the right upper extremity.  X-ray did show questionable fracture to the glenoid labrum of the right shoulder.  Patient will follow up with occupational medicine hopefully within the next couple of days for potential need of further imaging at that point.  Fully restricted with the right upper extremity.  Shoulder immobilizer given to patient today.  Recommend to wear most of the day especially while upright and working.  Patient can use Tylenol and ibuprofen over-the-counter per 's instructions as needed for pain and also recommend lots of rest and ice to the area.    Repetitive Actions   Hands: i.e. Fine Manipulations from Grasping:     Feet: i.e. Operating Foot Controls:     Driving / Operate Machinery:     Provider Name:   Atul Mcadams P.A.-C. Physician Signature:  Physician Name:     Clinic Name / Location: [facility] [address] [address] Clinic Phone Number: Dept: [phone]   Appointment Time: 1:40 Pm Visit Start Time: 2:34 PM   Check-In Time:  2:03 Pm Visit Discharge Time:  4:00 PM    Original-Treating Physician or Chiropractor    Page 2-Insurer/TPA    Page 3-Employer    Page 4-Employee

## 2021-07-06 NOTE — PROGRESS NOTES
Subjective:      Donovan Galeas is a 46 y.o. male who presents with Work-Related Injury (fili loaded with packeage on the back of the bumper, fili was stuck packages went forward and R arm went with the fili, pt states heard a pop and a tear )      DOI 7/6/2021: Patient states that he was loading the fili with packages on his back bumper and when he was done the dog was stuck on the bumper when he tried loosening the fili by pulling up but once the fili was loose the weight from the packages pulled the fili forward in his right shoulder with it.  Patient states he did take a couple of Advil with some relief.  States that the arm feels better when it is hanging down.  Patient denies any other job and no previous shoulder injury.     HPI  Patient presents today for work-related injury as described above.  Review of Systems   Musculoskeletal:        Right shoulder pain     PMH: No pertinent past medical history to this problem  MEDS: Medications were reviewed in Epic  ALLERGIES: Allergies were reviewed in Epic  SOCHX: Works as at [employer]  FH: No pertinent family history to this problem     Objective:     /58   Pulse 96   Temp 36.8 °C (98.3 °F) (Temporal)   Resp 16   Ht 1.829 m (6')   Wt 93 kg (205 lb)   SpO2 100%   BMI 27.80 kg/m²      Physical Exam  Vitals and nursing note reviewed.   Constitutional:       General: He is not in acute distress.     Appearance: Normal appearance. He is well-developed. He is not ill-appearing.   HENT:      Head: Normocephalic and atraumatic.      Right Ear: Hearing normal.      Left Ear: Hearing normal.   Eyes:      Conjunctiva/sclera: Conjunctivae normal.   Cardiovascular:      Rate and Rhythm: Normal rate and regular rhythm.      Heart sounds: Normal heart sounds.   Pulmonary:      Effort: Pulmonary effort is normal.   Musculoskeletal:      Comments: Right shoulder as described below   Skin:     General: Skin is warm and dry.   Neurological:      Mental Status: He  is alert.      Coordination: Coordination normal.   Psychiatric:         Mood and Affect: Mood normal.       Patient has limited range of motion in all planes with upper extremity on the right side secondary to pain.  Neurovascular intact distally and 5/5  strength.  Negative empty can test.  Tenderness to palpation to the anterior lateral and posterior portions of the right shoulder.  No step-off deformity noted.  No tenderness along the clavicle or the neck.  No midline spinous process tenderness.  DX SHOULDER  FINDINGS:  There is no evidence of glenohumeral dislocation.  There is minimal lucency in the midportion of the glenoid labrum which could represent a minimal labral fracture.  AC joint is intact. Minimal subacromial spurring is present.  The visualized lung parenchyma is clear.     IMPRESSION:     1.  Possible fracture of the glenoid labrum. Correlation with cross-sectional imaging may be of value.     2.  Minimal subacromial spurring.     3.  No shoulder dislocation.     Assessment/Plan:   1. Strain of right shoulder, initial encounter  - DX-SHOULDER 2+ RIGHT; Future  - REFERRAL TO OCCUPATIONAL MEDICINE  Restrictions are for the right upper extremity.  X-ray did show questionable fracture to the glenoid labrum of the right shoulder.  Patient will follow up with occupational medicine hopefully within the next couple of days for potential need of further imaging at that point.  Fully restricted with the right upper extremity.  Shoulder immobilizer given to patient today.  Recommend to wear most of the day especially while upright and working.  Patient can use Tylenol and ibuprofen over-the-counter per 's instructions as needed for pain and also recommend lots of rest and ice to the area.  Patient agreeable to plan.  Greater than 40 minutes were spent reviewing patient's chart, examining and obtaining history from patient, and discussing plan of care.

## 2021-07-08 ENCOUNTER — OCCUPATIONAL MEDICINE (OUTPATIENT)
Dept: OCCUPATIONAL MEDICINE | Facility: CLINIC | Age: 46
End: 2021-07-08
Payer: COMMERCIAL

## 2021-07-08 VITALS
OXYGEN SATURATION: 94 % | HEART RATE: 94 BPM | SYSTOLIC BLOOD PRESSURE: 132 MMHG | WEIGHT: 205 LBS | TEMPERATURE: 99 F | BODY MASS INDEX: 27.77 KG/M2 | HEIGHT: 72 IN | DIASTOLIC BLOOD PRESSURE: 78 MMHG | RESPIRATION RATE: 18 BRPM

## 2021-07-08 DIAGNOSIS — S42.141A GLENOID FRACTURE OF SHOULDER, RIGHT, CLOSED, INITIAL ENCOUNTER: ICD-10-CM

## 2021-07-08 DIAGNOSIS — S46.911D STRAIN OF RIGHT SHOULDER, SUBSEQUENT ENCOUNTER: ICD-10-CM

## 2021-07-08 DIAGNOSIS — S42.151A GLENOID FRACTURE OF SHOULDER, RIGHT, CLOSED, INITIAL ENCOUNTER: ICD-10-CM

## 2021-07-08 PROCEDURE — 99203 OFFICE O/P NEW LOW 30 MIN: CPT | Performed by: PREVENTIVE MEDICINE

## 2021-07-08 RX ORDER — TIZANIDINE 4 MG/1
4 TABLET ORAL
Qty: 20 TABLET | Refills: 0 | Status: SHIPPED | OUTPATIENT
Start: 2021-07-08 | End: 2022-04-05

## 2021-07-08 RX ORDER — IBUPROFEN 800 MG/1
800 TABLET ORAL EVERY 8 HOURS PRN
Qty: 60 TABLET | Refills: 1 | Status: SHIPPED | OUTPATIENT
Start: 2021-07-08 | End: 2022-12-04

## 2021-07-08 NOTE — LETTER
"   [address],   [address] [place] [address]  Phone:  [phone] - Fax:  [phone]   [facility] Progress Report and Disability Certification  Date of Service: 7/8/2021   No Show:  No  Date / Time of Next Visit: 7/23/2021 @ 8:15 AM    Claim Information   Patient Name: Donovan Galeas  Claim Number:     Employer: [employer]  Date of Injury: 7/6/2021     Insurer / TPA: Wanda Mammoth  ID / SSN:     Occupation:  DFRIVER Diagnosis: Diagnoses of Strain of right shoulder, subsequent encounter and Glenoid fracture of shoulder, right, closed, initial encounter were pertinent to this visit.    Medical Information   Related to Industrial Injury? Yes    Subjective Complaints:  DOI 7/6/2021: 45 yo male presents with right shoulder injury. HAI: He was loading the fili with packages on his back bumper and when he was done the fili was stuck on the bumper, he pulled up forcefully on the fili which loosened up but forcefully pulled his right shoulder forward suddenly.  He felt a pop and sudden pain.  He presented to the urgent care, x-ray of the right shoulder showed possible \"glenoid labral fracture.\"  Likely mid glenoid fossa.  Patient states pain is \"deep in the shoulder\" he does have some anterior lateral pain as well.  Has pain with any range of motion.  Especially when reaching to the side.  Denies any numbness or tingling.  He states he has been using the sling while at work.  Taking ibuprofen 800 mg for relief.  Denies prior right shoulder injuries.  He states that he has had many recommendations for Dr. Schaefer and would like to see her if orthopedic referral is indicated.   Objective Findings: Right shoulder: No gross deform.  Minimal tenderness over the anterior GH and bicep tendon, tendon intact.  Moderate tenderness over the lateral shoulder.  Range of motion diminished to less than 90 degrees flexion/abduction.  Crossarm test " positive.  Empty can test positive.  Speeds test negative.   Pre-Existing Condition(s):     Assessment:   Condition Same    Status: Additional Care Required  Permanent Disability:No    Plan:      Diagnostics:      Comments:  Given x-ray findings and physical exam findings concern for labral tear with possible glenoid fossa fracture to determine proper imaging study and further treatment  Will refer to orthopedics, sent referral to Dr. Schaefer  Prescribed ibuprofen 800   mg and tizanidine 4 mg to use as prescribed  Continue with sling  Restricted duty  Follow-up 2 weeks if not seen by orthopedics    Disability Information   Status: Released to Restricted Duty    From:  7/8/2021  Through: 7/23/2021 Restrictions are:     Physical Restrictions   Sitting:    Standing:    Stooping:    Bending:      Squatting:    Walking:    Climbing:    Pushing:      Pulling:    Other:    Reaching Above Shoulder (L):   Reaching Above Shoulder (R):       Reaching Below Shoulder (L):    Reaching Below Shoulder (R):      Not to exceed Weight Limits   Carrying(hrs):   Weight Limit(lb):   Lifting(hrs):   Weight  Limit(lb):     Comments: No use of the right arm while at work.  Must use sling while at work    Repetitive Actions   Hands: i.e. Fine Manipulations from Grasping:     Feet: i.e. Operating Foot Controls:     Driving / Operate Machinery:     Provider Name:   Jerald Spears D.O. Physician Signature:  Physician Name:     Clinic Name / Location: [address],   [address] [place] [address] Clinic Phone Number: Dept: [phone]   Appointment Time: 3:45 Pm Visit Start Time: 3:50 PM   Check-In Time:  3:40 Pm Visit Discharge Time:  4:22 PM    Original-Treating Physician or Chiropractor    Page 2-Insurer/TPA    Page 3-Employer    Page 4-Employee

## 2021-07-08 NOTE — PROGRESS NOTES
"Subjective:     Donovan Galeas is a 46 y.o. male who presents for Follow-Up ([facility] DOI 7/6/21 Rt shoulder, same, rm 16)      DOI 7/6/2021: 47 yo male presents with right shoulder injury. HAI: He was loading the fili with packages on his back bumper and when he was done the fili was stuck on the bumper, he pulled up forcefully on the fili which loosened up but forcefully pulled his right shoulder forward suddenly.  He felt a pop and sudden pain.  He presented to the urgent care, x-ray of the right shoulder showed possible \"glenoid labral fracture.\"  Likely mid glenoid fossa.  Patient states pain is \"deep in the shoulder\" he does have some anterior lateral pain as well.  Has pain with any range of motion.  Especially when reaching to the side.  Denies any numbness or tingling.  He states he has been using the sling while at work.  Taking ibuprofen 800 mg for relief.  Denies prior right shoulder injuries.  He states that he has had many recommendations for Dr. Schaefer and would like to see her if orthopedic referral is indicated.    ROS: All systems were reviewed on intake form, form was reviewed and signed. See scanned documents in media. Pertinent positives and negatives included in HPI.    PMH: No pertinent past medical history to this problem  MEDS: Medications were reviewed in Epic  ALLERGIES:   Allergies   Allergen Reactions   • Seasonal Runny Nose and Itching     Sneezing.  Had allergy testing done.     SOCHX: Works as a  at [employer]  FH: No pertinent family history to this problem       Objective:     /78   Pulse 94   Temp 37.2 °C (99 °F)   Resp 18   Ht 1.829 m (6')   Wt 93 kg (205 lb)   SpO2 94%   BMI 27.80 kg/m²     Constitutional: Patient is in no acute distress. Appears well-developed and well-nourished.   HENT: Normocephalic and atraumatic. EOM are normal. No scleral icterus.   Cardiovascular: Normal rate.    Pulmonary/Chest: Effort normal. No respiratory distress. "   Neurological: Patient is alert and oriented to person, place, and time.   Skin: Skin is warm and dry.   Psychiatric: Normal mood and affect. Behavior is normal.     Right shoulder: No gross deform.  Minimal tenderness over the anterior GH and bicep tendon, tendon intact.  Moderate tenderness over the lateral shoulder.  Range of motion diminished to less than 90 degrees flexion/abduction.  Crossarm test positive.  Empty can test positive.  Speeds test negative.    Assessment/Plan:       1. Strain of right shoulder, subsequent encounter  - REFERRAL TO ORTHOPEDICS  - tizanidine (ZANAFLEX) 4 MG Tab; Take 1 tablet by mouth at bedtime as needed.  Dispense: 20 tablet; Refill: 0  - ibuprofen (MOTRIN) 800 MG Tab; Take 1 tablet by mouth every 8 hours as needed.  Dispense: 60 tablet; Refill: 1    2. Glenoid fracture of shoulder, right, closed, initial encounter  - REFERRAL TO ORTHOPEDICS  - tizanidine (ZANAFLEX) 4 MG Tab; Take 1 tablet by mouth at bedtime as needed.  Dispense: 20 tablet; Refill: 0  - ibuprofen (MOTRIN) 800 MG Tab; Take 1 tablet by mouth every 8 hours as needed.  Dispense: 60 tablet; Refill: 1    Released to Restricted Duty FROM 7/8/2021 TO 7/23/2021  No use of the right arm while at work.  Must use sling while at work  Given x-ray findings and physical exam findings concern for labral tear with possible glenoid fossa fracture to determine proper imaging study and further treatment  Will refer to orthopedics, sent referral to Dr. Schaefer  Prescribed ibuprofen 800   mg and tizanidine 4 mg to use as prescribed  Continue with sling  Restricted duty  Follow-up 2 weeks if not seen by orthopedics    Differential diagnosis, natural history, supportive care, and indications for immediate follow-up discussed.    Approximately 35 minutes were spent in reviewing notes, preparing for visit, obtaining history, exam and evaluation, patient counseling/education and post visit documentation/orders.

## 2021-07-23 ENCOUNTER — OCCUPATIONAL MEDICINE (OUTPATIENT)
Dept: OCCUPATIONAL MEDICINE | Facility: CLINIC | Age: 46
End: 2021-07-23
Payer: COMMERCIAL

## 2021-07-23 VITALS
OXYGEN SATURATION: 97 % | WEIGHT: 205 LBS | DIASTOLIC BLOOD PRESSURE: 82 MMHG | HEART RATE: 100 BPM | BODY MASS INDEX: 27.77 KG/M2 | HEIGHT: 72 IN | TEMPERATURE: 98.5 F | RESPIRATION RATE: 18 BRPM | SYSTOLIC BLOOD PRESSURE: 124 MMHG

## 2021-07-23 DIAGNOSIS — S42.141A GLENOID FRACTURE OF SHOULDER, RIGHT, CLOSED, INITIAL ENCOUNTER: ICD-10-CM

## 2021-07-23 DIAGNOSIS — S42.151A GLENOID FRACTURE OF SHOULDER, RIGHT, CLOSED, INITIAL ENCOUNTER: ICD-10-CM

## 2021-07-23 DIAGNOSIS — S46.911D STRAIN OF RIGHT SHOULDER, SUBSEQUENT ENCOUNTER: ICD-10-CM

## 2021-07-23 PROCEDURE — 99213 OFFICE O/P EST LOW 20 MIN: CPT | Performed by: PREVENTIVE MEDICINE

## 2021-07-23 NOTE — PROGRESS NOTES
"Subjective:     Donovan Galeas is a 46 y.o. male who presents for Follow-Up (WC DOI 7/6/21 Rt shoulder, same, rm 16)      DOI 7/6/2021: 47 yo male presents with right shoulder injury. HAI: He was loading the fili with packages on his back bumper and when he was done the fili was stuck on the bumper, he pulled up forcefully on the fili which loosened up but forcefully pulled his right shoulder forward suddenly.  He felt a pop and sudden pain.  X-ray of the right shoulder showed possible \"glenoid labral fracture.\"  Patient states he has had improvement in pain and range of motion.  He is able to raise his arm at about 90 degrees.  He states pain at rest is minimal to none.  However if the moves his arm wrong or reaches wrong he will get sudden pain.  He states he has an appointment with orthopedics on August 4 and an appointment for the MRI August 14.    ROS         Objective:     /82   Pulse 100   Temp 36.9 °C (98.5 °F)   Resp 18   Ht 1.829 m (6')   Wt 93 kg (205 lb)   SpO2 97%   BMI 27.80 kg/m²     Constitutional: Patient is in no acute distress. Appears well-developed and well-nourished.   Cardiovascular: Normal rate.    Pulmonary/Chest: Effort normal. No respiratory distress.   Neurological: Patient is alert and oriented to person, place, and time.   Skin: Skin is warm and dry.   Psychiatric: Normal mood and affect. Behavior is normal.     Right shoulder: No gross deform.  Range of motion diminished to 90 degrees flexion/abduction.    Assessment/Plan:       1. Glenoid fracture of shoulder, right, closed, initial encounter    2. Strain of right shoulder, subsequent encounter    Released to Restricted Duty FROM 7/23/2021 TO    No use of the right arm while at work.  Until cleared by orthopedics  Keep appointment with orthopedics  Continue ibuprofen 800 mg and tizanidine 4 mg to use as needed  Restricted duty until cleared by orthopedics  Follow-up as needed    Differential diagnosis, natural " history, supportive care, and indications for immediate follow-up discussed.    Approximately 20 minutes was spent in preparing for visit, obtaining history, exam and evaluation, patient counseling/education and post visit documentation/orders.

## 2021-07-23 NOTE — LETTER
"   [address],   [address] [place] [address]  Phone:  [phone]- Fax:  [phone]   [facility] Progress Report and Disability Certification  Date of Service: 7/23/2021   No Show:  No  Date / Time of Next Visit:  JUDY Ortho   Claim Information   Patient Name: Donovan Galeas  Claim Number:     Employer: [employer]  Date of Injury: 7/6/2021     Insurer / TPA: Wanda Worth  ID / SSN:     Occupation:   Diagnosis: Diagnoses of Glenoid fracture of shoulder, right, closed, initial encounter and Strain of right shoulder, subsequent encounter were pertinent to this visit.    Medical Information   Related to Industrial Injury? Yes    Subjective Complaints:  DOI 7/6/2021: 47 yo male presents with right shoulder injury. HAI: He was loading the fili with packages on his back bumper and when he was done the fili was stuck on the bumper, he pulled up forcefully on the fili which loosened up but forcefully pulled his right shoulder forward suddenly.  He felt a pop and sudden pain.  X-ray of the right shoulder showed possible \"glenoid labral fracture.\"  Patient states he has had improvement in pain and range of motion.  He is able to raise his arm at about 90 degrees.  He states pain at rest is minimal to none.  However if the moves his arm wrong or reaches wrong he will get sudden pain.  He states he has an appointment with orthopedics on August 4 and an appointment for the MRI August 14.   Objective Findings: Right shoulder: No gross deform.  Range of motion diminished to 90 degrees flexion/abduction.   Pre-Existing Condition(s):     Assessment:   Condition Same    Status: Discharged / Care Transfer  Permanent Disability:No    Plan:      Diagnostics:      Comments:  Keep appointment with orthopedics  Continue ibuprofen 800 mg and tizanidine 4 mg to use as needed  Restricted duty until cleared by orthopedics  Follow-up as needed    "   Disability Information   Status: Released to Restricted Duty    From:  7/23/2021  Through:   Restrictions are: Temporary   Physical Restrictions   Sitting:    Standing:    Stooping:    Bending:      Squatting:    Walking:    Climbing:    Pushing:      Pulling:    Other:    Reaching Above Shoulder (L):   Reaching Above Shoulder (R):       Reaching Below Shoulder (L):    Reaching Below Shoulder (R):      Not to exceed Weight Limits   Carrying(hrs):   Weight Limit(lb):   Lifting(hrs):   Weight  Limit(lb):     Comments: No use of the right arm while at work.  Until cleared by orthopedics    Repetitive Actions   Hands: i.e. Fine Manipulations from Grasping:     Feet: i.e. Operating Foot Controls:     Driving / Operate Machinery:     Provider Name:   Jerald Spears D.O. Physician Signature:  Physician Name:     Clinic Name / Location: [address],   [address] [address] Clinic Phone Number: Dept: [phone]   Appointment Time: 8:15 Am Visit Start Time: 8:11 AM   Check-In Time:  8:10 Am Visit Discharge Time:  8:35 am    Original-Treating Physician or Chiropractor    Page 2-Insurer/TPA    Page 3-Employer    Page 4-Employee

## 2022-04-05 ENCOUNTER — PRE-ADMISSION TESTING (OUTPATIENT)
Dept: ADMISSIONS | Facility: MEDICAL CENTER | Age: 47
End: 2022-04-05
Attending: ORTHOPAEDIC SURGERY
Payer: COMMERCIAL

## 2022-04-05 NOTE — PREPROCEDURE INSTRUCTIONS
Pre-admit appointment completed.  Pt instructed to continue regularly prescribed medications through the day before surgery.     MET's=>4.

## 2022-04-11 ENCOUNTER — APPOINTMENT (OUTPATIENT)
Dept: ADMISSIONS | Facility: MEDICAL CENTER | Age: 47
End: 2022-04-11
Attending: ORTHOPAEDIC SURGERY
Payer: COMMERCIAL

## 2022-04-11 DIAGNOSIS — Z01.812 PRE-OPERATIVE LABORATORY EXAMINATION: ICD-10-CM

## 2022-04-14 ENCOUNTER — ANESTHESIA EVENT (OUTPATIENT)
Dept: SURGERY | Facility: MEDICAL CENTER | Age: 47
End: 2022-04-14
Payer: COMMERCIAL

## 2022-04-15 ENCOUNTER — HOSPITAL ENCOUNTER (OUTPATIENT)
Facility: MEDICAL CENTER | Age: 47
End: 2022-04-15
Attending: ORTHOPAEDIC SURGERY | Admitting: ORTHOPAEDIC SURGERY
Payer: COMMERCIAL

## 2022-04-15 ENCOUNTER — ANESTHESIA (OUTPATIENT)
Dept: SURGERY | Facility: MEDICAL CENTER | Age: 47
End: 2022-04-15
Payer: COMMERCIAL

## 2022-04-15 VITALS
OXYGEN SATURATION: 94 % | HEART RATE: 76 BPM | BODY MASS INDEX: 27.74 KG/M2 | HEIGHT: 72 IN | RESPIRATION RATE: 16 BRPM | TEMPERATURE: 97.7 F | DIASTOLIC BLOOD PRESSURE: 93 MMHG | WEIGHT: 204.81 LBS | SYSTOLIC BLOOD PRESSURE: 134 MMHG

## 2022-04-15 PROCEDURE — 160036 HCHG PACU - EA ADDL 30 MINS PHASE I: Performed by: ORTHOPAEDIC SURGERY

## 2022-04-15 PROCEDURE — 160041 HCHG SURGERY MINUTES - EA ADDL 1 MIN LEVEL 4: Performed by: ORTHOPAEDIC SURGERY

## 2022-04-15 PROCEDURE — 700111 HCHG RX REV CODE 636 W/ 250 OVERRIDE (IP): Performed by: ORTHOPAEDIC SURGERY

## 2022-04-15 PROCEDURE — A9270 NON-COVERED ITEM OR SERVICE: HCPCS | Performed by: ANESTHESIOLOGY

## 2022-04-15 PROCEDURE — 700101 HCHG RX REV CODE 250: Performed by: ANESTHESIOLOGY

## 2022-04-15 PROCEDURE — 160025 RECOVERY II MINUTES (STATS): Performed by: ORTHOPAEDIC SURGERY

## 2022-04-15 PROCEDURE — 160048 HCHG OR STATISTICAL LEVEL 1-5: Performed by: ORTHOPAEDIC SURGERY

## 2022-04-15 PROCEDURE — 01630 ANES OPN/ARTHR PX SHO JT NOS: CPT | Performed by: ANESTHESIOLOGY

## 2022-04-15 PROCEDURE — 160002 HCHG RECOVERY MINUTES (STAT): Performed by: ORTHOPAEDIC SURGERY

## 2022-04-15 PROCEDURE — 64415 NJX AA&/STRD BRCH PLXS IMG: CPT | Mod: 59 | Performed by: ANESTHESIOLOGY

## 2022-04-15 PROCEDURE — 160029 HCHG SURGERY MINUTES - 1ST 30 MINS LEVEL 4: Performed by: ORTHOPAEDIC SURGERY

## 2022-04-15 PROCEDURE — 160035 HCHG PACU - 1ST 60 MINS PHASE I: Performed by: ORTHOPAEDIC SURGERY

## 2022-04-15 PROCEDURE — 64415 NJX AA&/STRD BRCH PLXS IMG: CPT | Performed by: ORTHOPAEDIC SURGERY

## 2022-04-15 PROCEDURE — 160009 HCHG ANES TIME/MIN: Performed by: ORTHOPAEDIC SURGERY

## 2022-04-15 PROCEDURE — 76942 ECHO GUIDE FOR BIOPSY: CPT | Mod: 26 | Performed by: ANESTHESIOLOGY

## 2022-04-15 PROCEDURE — 160046 HCHG PACU - 1ST 60 MINS PHASE II: Performed by: ORTHOPAEDIC SURGERY

## 2022-04-15 PROCEDURE — 700102 HCHG RX REV CODE 250 W/ 637 OVERRIDE(OP): Performed by: ANESTHESIOLOGY

## 2022-04-15 PROCEDURE — 700111 HCHG RX REV CODE 636 W/ 250 OVERRIDE (IP): Performed by: ANESTHESIOLOGY

## 2022-04-15 PROCEDURE — 700105 HCHG RX REV CODE 258: Performed by: ORTHOPAEDIC SURGERY

## 2022-04-15 RX ORDER — EPINEPHRINE 1 MG/ML(1)
AMPUL (ML) INJECTION
Status: DISCONTINUED | OUTPATIENT
Start: 2022-04-15 | End: 2022-04-15 | Stop reason: HOSPADM

## 2022-04-15 RX ORDER — ONDANSETRON 2 MG/ML
4 INJECTION INTRAMUSCULAR; INTRAVENOUS
Status: DISCONTINUED | OUTPATIENT
Start: 2022-04-15 | End: 2022-04-15 | Stop reason: HOSPADM

## 2022-04-15 RX ORDER — DEXAMETHASONE SODIUM PHOSPHATE 4 MG/ML
INJECTION, SOLUTION INTRA-ARTICULAR; INTRALESIONAL; INTRAMUSCULAR; INTRAVENOUS; SOFT TISSUE PRN
Status: DISCONTINUED | OUTPATIENT
Start: 2022-04-15 | End: 2022-04-15 | Stop reason: SURG

## 2022-04-15 RX ORDER — OXYCODONE HCL 5 MG/5 ML
10 SOLUTION, ORAL ORAL
Status: COMPLETED | OUTPATIENT
Start: 2022-04-15 | End: 2022-04-15

## 2022-04-15 RX ORDER — HALOPERIDOL 5 MG/ML
1 INJECTION INTRAMUSCULAR
Status: DISCONTINUED | OUTPATIENT
Start: 2022-04-15 | End: 2022-04-15 | Stop reason: HOSPADM

## 2022-04-15 RX ORDER — HYDROMORPHONE HYDROCHLORIDE 1 MG/ML
0.5 INJECTION, SOLUTION INTRAMUSCULAR; INTRAVENOUS; SUBCUTANEOUS
Status: DISCONTINUED | OUTPATIENT
Start: 2022-04-15 | End: 2022-04-15 | Stop reason: HOSPADM

## 2022-04-15 RX ORDER — HYDROMORPHONE HYDROCHLORIDE 1 MG/ML
0.2 INJECTION, SOLUTION INTRAMUSCULAR; INTRAVENOUS; SUBCUTANEOUS
Status: DISCONTINUED | OUTPATIENT
Start: 2022-04-15 | End: 2022-04-15 | Stop reason: HOSPADM

## 2022-04-15 RX ORDER — LIDOCAINE HYDROCHLORIDE 20 MG/ML
INJECTION, SOLUTION EPIDURAL; INFILTRATION; INTRACAUDAL; PERINEURAL PRN
Status: DISCONTINUED | OUTPATIENT
Start: 2022-04-15 | End: 2022-04-15 | Stop reason: SURG

## 2022-04-15 RX ORDER — DIPHENHYDRAMINE HYDROCHLORIDE 50 MG/ML
12.5 INJECTION INTRAMUSCULAR; INTRAVENOUS
Status: DISCONTINUED | OUTPATIENT
Start: 2022-04-15 | End: 2022-04-15 | Stop reason: HOSPADM

## 2022-04-15 RX ORDER — SODIUM CHLORIDE, SODIUM LACTATE, POTASSIUM CHLORIDE, CALCIUM CHLORIDE 600; 310; 30; 20 MG/100ML; MG/100ML; MG/100ML; MG/100ML
INJECTION, SOLUTION INTRAVENOUS CONTINUOUS
Status: DISCONTINUED | OUTPATIENT
Start: 2022-04-15 | End: 2022-04-15 | Stop reason: HOSPADM

## 2022-04-15 RX ORDER — MIDAZOLAM HYDROCHLORIDE 1 MG/ML
1 INJECTION INTRAMUSCULAR; INTRAVENOUS
Status: DISCONTINUED | OUTPATIENT
Start: 2022-04-15 | End: 2022-04-15 | Stop reason: HOSPADM

## 2022-04-15 RX ORDER — MEPERIDINE HYDROCHLORIDE 25 MG/ML
12.5 INJECTION INTRAMUSCULAR; INTRAVENOUS; SUBCUTANEOUS
Status: DISCONTINUED | OUTPATIENT
Start: 2022-04-15 | End: 2022-04-15 | Stop reason: HOSPADM

## 2022-04-15 RX ORDER — BUPIVACAINE HYDROCHLORIDE 5 MG/ML
INJECTION, SOLUTION EPIDURAL; INTRACAUDAL PRN
Status: DISCONTINUED | OUTPATIENT
Start: 2022-04-15 | End: 2022-04-15 | Stop reason: HOSPADM

## 2022-04-15 RX ORDER — HYDROMORPHONE HYDROCHLORIDE 1 MG/ML
0.4 INJECTION, SOLUTION INTRAMUSCULAR; INTRAVENOUS; SUBCUTANEOUS
Status: DISCONTINUED | OUTPATIENT
Start: 2022-04-15 | End: 2022-04-15 | Stop reason: HOSPADM

## 2022-04-15 RX ORDER — OXYCODONE HCL 5 MG/5 ML
5 SOLUTION, ORAL ORAL
Status: COMPLETED | OUTPATIENT
Start: 2022-04-15 | End: 2022-04-15

## 2022-04-15 RX ORDER — MIDAZOLAM HYDROCHLORIDE 1 MG/ML
INJECTION INTRAMUSCULAR; INTRAVENOUS PRN
Status: DISCONTINUED | OUTPATIENT
Start: 2022-04-15 | End: 2022-04-15 | Stop reason: SURG

## 2022-04-15 RX ORDER — CEFAZOLIN SODIUM 1 G/3ML
INJECTION, POWDER, FOR SOLUTION INTRAMUSCULAR; INTRAVENOUS PRN
Status: DISCONTINUED | OUTPATIENT
Start: 2022-04-15 | End: 2022-04-15 | Stop reason: SURG

## 2022-04-15 RX ORDER — ONDANSETRON 2 MG/ML
INJECTION INTRAMUSCULAR; INTRAVENOUS PRN
Status: DISCONTINUED | OUTPATIENT
Start: 2022-04-15 | End: 2022-04-15 | Stop reason: SURG

## 2022-04-15 RX ORDER — HYDRALAZINE HYDROCHLORIDE 20 MG/ML
5 INJECTION INTRAMUSCULAR; INTRAVENOUS
Status: DISCONTINUED | OUTPATIENT
Start: 2022-04-15 | End: 2022-04-15 | Stop reason: HOSPADM

## 2022-04-15 RX ADMIN — DEXAMETHASONE SODIUM PHOSPHATE 2 MG: 4 INJECTION, SOLUTION INTRAMUSCULAR; INTRAVENOUS at 12:28

## 2022-04-15 RX ADMIN — OXYCODONE HYDROCHLORIDE 10 MG: 5 SOLUTION ORAL at 13:58

## 2022-04-15 RX ADMIN — FENTANYL CITRATE 100 MCG: 50 INJECTION, SOLUTION INTRAMUSCULAR; INTRAVENOUS at 13:10

## 2022-04-15 RX ADMIN — MIDAZOLAM HYDROCHLORIDE 2 MG: 1 INJECTION, SOLUTION INTRAMUSCULAR; INTRAVENOUS at 12:26

## 2022-04-15 RX ADMIN — FENTANYL CITRATE 25 MCG: 50 INJECTION, SOLUTION INTRAMUSCULAR; INTRAVENOUS at 14:24

## 2022-04-15 RX ADMIN — FENTANYL CITRATE 25 MCG: 50 INJECTION, SOLUTION INTRAMUSCULAR; INTRAVENOUS at 14:08

## 2022-04-15 RX ADMIN — BUPIVACAINE HYDROCHLORIDE 15 ML: 5 INJECTION, SOLUTION EPIDURAL; INTRACAUDAL; PERINEURAL at 12:28

## 2022-04-15 RX ADMIN — PROPOFOL 200 MG: 10 INJECTION, EMULSION INTRAVENOUS at 12:40

## 2022-04-15 RX ADMIN — LIDOCAINE HYDROCHLORIDE 100 MG: 20 INJECTION, SOLUTION EPIDURAL; INFILTRATION; INTRACAUDAL; PERINEURAL at 12:40

## 2022-04-15 RX ADMIN — CEFAZOLIN 2 G: 330 INJECTION, POWDER, FOR SOLUTION INTRAMUSCULAR; INTRAVENOUS at 12:37

## 2022-04-15 RX ADMIN — ONDANSETRON 4 MG: 2 INJECTION INTRAMUSCULAR; INTRAVENOUS at 13:17

## 2022-04-15 RX ADMIN — SODIUM CHLORIDE, POTASSIUM CHLORIDE, SODIUM LACTATE AND CALCIUM CHLORIDE: 600; 310; 30; 20 INJECTION, SOLUTION INTRAVENOUS at 12:37

## 2022-04-15 RX ADMIN — FENTANYL CITRATE 100 MCG: 50 INJECTION, SOLUTION INTRAMUSCULAR; INTRAVENOUS at 12:40

## 2022-04-15 ASSESSMENT — PAIN DESCRIPTION - PAIN TYPE: TYPE: SURGICAL PAIN

## 2022-04-15 ASSESSMENT — PAIN SCALES - GENERAL: PAIN_LEVEL: 3

## 2022-04-15 NOTE — OR NURSING
1325: To PACU from OR via gurney, sleeping, respirations spontaneous and non-labored via OPA. Icepack applied over c/d/i right shoulder surgical dressings.   1338: OPA dc'd.  1340: Pt rouses to voice, denies nausea and pain, encouraged to rest.  1355: Pt complaining of pain, plan analgesia.  1405: Pt states pain has not improved, plan further analgesia.  1410: Pt resting would like to wait to see if pain improves before more medication.  1425: Pt states pain has not improved, plan further analgesia.  1440: Pt states pain is much improved and tolerable. No change in surgical site assessment. Meets criteria to transfer to Stage 2.  1448: Report called to Lori HARKINS.

## 2022-04-15 NOTE — OR SURGEON
Immediate Post OP Note    PreOp Diagnosis: right shoulder adhesive capsulitis      PostOp Diagnosis: same      Procedure(s): Right  ARTHROSCOPY, SHOULDER - CAPSULAR RELEASE - Wound Class: Clean  MANIPULATION, SHOULDER - Wound Class: Clean  BURSECTOMY - Wound Class: Clean    Surgeon(s):  Qi Schaefer M.D.    Anesthesiologist/Type of Anesthesia:  Anesthesiologist: Husam Olvera M.D./General    Surgical Staff:  Circulator: Mita No R.N.; Abigail Santacruz R.N.  Relief Circulator: Heather C Cogan, R.N.  Scrub Person: Marina Gresham  First Assist: Antwon Chadwick CCarlosNCarlosACarlos    Specimens removed if any:  * No specimens in log *    Estimated Blood Loss: minimal    Findings: as above    Complications: none        4/15/2022 1:20 PM Ciera Shepard P.A.-C.

## 2022-04-15 NOTE — OP REPORT
DATE OF SERVICE:  04/15/2022     PREOPERATIVE DIAGNOSES:  Right shoulder adhesive capsulitis, status post   glenoid fracture.     POSTOPERATIVE DIAGNOSIS:  Right shoulder adhesive capsulitis, status post   glenoid fracture.     PROCEDURES:  Right shoulder arthroscopy, subacromial bursectomy, arthroscopic   capsular release, manipulation under anesthesia.     SURGEON:  Qi Schaefer MD     ASSISTANT:  Antwon Chadwick CFA     ANESTHESIOLOGIST:  Husam Olvera MD     TYPE OF ANESTHESIA:  General, with preoperative interscalene nerve block.     INTRAVENOUS FLUID:  1 liter crystalloid.     ESTIMATED BLOOD LOSS:  Minimal.     DRAINS:  None.     SPECIMENS:  None.     COMPLICATIONS:  None.     IMPLANTS:  None.     REASON FOR PROCEDURE:  The patient is a 46-year-old male who sustained a   glenoid fracture last year, treated conservatively, followed by arthroscopy   due to adhesive capsulitis.  He has failed to respond well to continued   physical therapy as well as an additional capsular release with significant   stiffness and pain.  We decided to proceed with a repeat capsular release as   well as manipulation under anesthesia.     DESCRIPTION OF PROCEDURE:  The patient was given a right interscalene nerve   block by the anesthesiologist before surgery.  Once back in the operating   room, a breathing tube was placed.  He was given 2 grams of IV Ancef.  He was   placed in the lateral decubitus position.  His right shoulder was examined   under anesthesia.  Forward flexion was able initially only to 100 degrees with   external rotation to 0 degrees.  With a gentle manipulation, forward flexion   improved to 170 degrees and external rotation improved to 70 degrees.  The   right upper extremity was then prepped with ChloraPrep.  It was draped in   standard sterile fashion.  Bony landmarks were drawn and a standard posterior   portal was established.  The arthroscope was then inserted into the   glenohumeral joint.  An  anterosuperior cannula was placed in the rotator   interval, just beneath the biceps tendon.  A probe was inserted.  The   glenohumeral joint cartilage surfaces were examined.  There was a very faint   small linear defect at the anteroinferior aspect of the glenoid consistent   with the patient's prior fracture line.  There was no incongruency noted to   the cartilage of the glenoid at all.  The labrum was intact.  There were   significant adhesions and thickened capsule.  The shaver was used to perform a   capsular release circumferentially leaving just a small portion of the intact   capsule at the far inferior aspect ____ damage to the underlying axillary   neurovascular bundle.  Portals were switched and the capsular release was   continued posteriorly.  The arthroscope was then inserted into the subacromial   space.  A lateral portal was established.  There was a copious amount of   thickened, inflamed bursal tissue.  This was removed with the 4.0 aggressive   shaver to reveal mildly scuffed, but intact rotator cuff tendon.  A formal   decompression was not performed at this time.  After a near complete   subacromial bursectomy took place, all instruments were removed.  Portals were   closed with 3-0 nylon suture.  A sterile dressing was applied.  All drapes   were removed and the arm was carefully placed into a simple sling.  The   patient was placed supine on a stretcher and taken to recovery room, in stable   condition.        ______________________________  MD REN ZULUAGA/PATRICIA    DD:  04/15/2022 13:34  DT:  04/15/2022 14:06    Job#:  [id number]

## 2022-04-15 NOTE — DISCHARGE INSTRUCTIONS
ACTIVITY: Rest and take it easy for the first 24 hours.  A responsible adult is recommended to remain with you during that time.  It is normal to feel sleepy.  We encourage you to not do anything that requires balance, judgment or coordination.    MILD FLU-LIKE SYMPTOMS ARE NORMAL. YOU MAY EXPERIENCE GENERALIZED MUSCLE ACHES, THROAT IRRITATION, HEADACHE AND/OR SOME NAUSEA.    FOR 24 HOURS DO NOT:  Drive, operate machinery or run household appliances.  Drink beer or alcoholic beverages.   Make important decisions or sign legal documents.    SPECIAL INSTRUCTIONS:      Post-Operative Instructions - Shoulder Arthroscopy       Dressing and wound care: Keep your shoulder dressing clean and dry after surgery.  Be aware that some leaking of blood or fluid from your dressing can occur and is normal. You may remove your dressing 3 days after the operation.  Notice that you have a single incision and/or several small incisions that have been closed with sutures. Cover each of these incisions with a light dressing or band-aids.  This keeps the surgical incisions clean, as well as preventing your clothes from spotting with blood or fluid.  Change band-aids or light dressing daily.     Shower / bathing: Keep the shoulder dry for 3 days after your surgery.  Then, you may shower. You may let soap and water run over skin incisions, but do not immerse your shoulder in water.  No swimming pools, hot tubs, or baths are recommended until at least 6 weeks after surgery.     Ice: Apply an ice pack to your shoulder (15 minutes on the shoulder, 15 minutes off the shoulder), as you feel necessary to help with the pain and swelling.   If you have a cold therapy, use liberally as directed.     Sling / Shoulder Immobilizer: The sling should be on except when bathing or doing your exercises.  You may also carefully remove the sling when awake and seated.  Be sure to support your forearm with a pillow so your shoulder remains in a relaxed and  comfortable position.  Please wear the sling while sleeping.     Activity: It is important to move your shoulder, as well as your elbow, wrist, and hand several times daily, starting the day after surgery.  You may do pendulum exercises with your operative arm starting the day after surgery.  Pendulum (dangling [place]) exercises are encouraged 3-5 times daily.  The sling will need to be removed for pendulum exercises.  You may loosen the sling to use your operative arm for light simple activities such as eating, brushing teeth, or writing/typing.  Nothing heavier than a cup of coffee in your hand on the operative side until otherwise recommended.     Pain medication: Take your pain medicine, as needed and prescribed.  Do not drive or operate machinery while taking narcotic pain medication.   You may start or resume anti-inflammatory medication (i.e. ibuprofen, naproxen) anytime after surgery, which should be taken with food to avoid stomach irritation.     Driving: You may drive as soon as you are off narcotic pain medication and feel comfortable behind the wheel.  Loosen or remove your sling, supporting your forearm on a pillow when you drive.  It is important for both hands to have access to the steering wheel for safety.     Aspirin: Take one low dose 81 mg aspirin starting the morning after surgery twice daily - one in the morning and one in the evening - for two weeks following surgery.     Problems:     If you are having problems with your shoulder (unexpected pain, excessive bleeding or discharge from your incisions, fevers/chills) do not hesitate to call the office or visit the nearest emergency room for evaluation.     Follow-up:     Make sure that you have an appointment 7-14 days following surgery.  Your stitches will be removed, and your procedure/rehabilitation will be discussed at that time.   Physical therapy may be prescribed at that time.     Qi Schaefer MD   [facility]    [phone]    DIET: To avoid nausea, slowly advance diet as tolerated, avoiding spicy or greasy foods for the first day.  Add more substantial food to your diet according to your physician's instructions. INCREASE FLUIDS AND FIBER TO AVOID CONSTIPATION.    You should CALL YOUR PHYSICIAN if you develop:  Fever greater than 101 degrees F.  Pain not relieved by medication, or persistent nausea or vomiting.  Excessive bleeding (blood soaking through dressing) or unexpected drainage from the wound.  Extreme redness or swelling around the incision site, drainage of pus or foul smelling drainage.  Inability to urinate or empty your bladder within 8 hours.  Problems with breathing or chest pain.    You should call 911 if you develop problems with breathing or chest pain.  If you are unable to contact your doctor or surgical center, you should go to the nearest emergency room or urgent care center.  Physician's telephone #: Dr. Schaefer [phone]    If any questions arise, call your doctor.  If your doctor is not available, please feel free to call the Surgical Center at [phone].     A registered nurse may call you a few days after your surgery to see how you are doing after your procedure.    MEDICATIONS: Resume taking daily medication.  Take prescribed pain medication with food.  If no medication is prescribed, you may take non-aspirin pain medication if needed.  PAIN MEDICATION CAN BE VERY CONSTIPATING.  Take a stool softener or laxative such as senokot, pericolace, or milk of magnesia if needed.    Prescription given prior to surgery.  Last pain medication given at 1:58pm, 10mg of oxycodone.    If your physician has prescribed pain medication that includes Acetaminophen (Tylenol), do not take additional Acetaminophen (Tylenol) while taking the prescribed medication.    Depression / Suicide Risk    As you are discharged from this [facility] facility, it is important to learn how to keep safe from harming  yourself.    Recognize the warning signs:  · Abrupt changes in personality, positive or negative- including increase in energy   · Giving away possessions  · Change in eating patterns- significant weight changes-  positive or negative  · Change in sleeping patterns- unable to sleep or sleeping all the time   · Unwillingness or inability to communicate  · Depression  · Unusual sadness, discouragement and loneliness  · Talk of wanting to die  · Neglect of personal appearance   · Rebelliousness- reckless behavior  · Withdrawal from people/activities they love  · Confusion- inability to concentrate     If you or a loved one observes any of these behaviors or has concerns about self-harm, here's what you can do:  · Talk about it- your feelings and reasons for harming yourself  · Remove any means that you might use to hurt yourself (examples: pills, rope, extension cords, firearm)  · Get professional help from the community (Mental Health, Substance Abuse, psychological counseling)  · Do not be alone:Call your Safe Contact- someone whom you trust who will be there for you.  · Call your local CRISIS HOTLINE 912-9809 or 110-306-0838  · Call your local Children's Mobile Crisis Response Team [place] (453) 867-2839 or www.[employer]  · Call the toll free National Suicide Prevention Hotlines   · National Suicide Prevention Lifeline 039-811-NVFL (2759)  National Hope Line Network 800-SUICIDE (283-0724)    Peripheral Nerve Block Discharge Instructions from Same Day Surgery and Inpatient :    What to Expect - Upper Extremity  · You may experience numbness and weakness in shoulder, arm and hand  on the same side as your surgery  · This is normal. For some people, this may be an unpleasant sensation. Be very careful with your numb limb  · Ask for help when you need it  Shoulder Surgery Side Effects  · In addition to numbness and weakness you may experience other symptoms  · Other nerves that are close to those nerves  "injected can also be affected by local anesthesia  · You may experience a hoarseness in your voice  · Your breathing may feel different  · You may also notice drooping of your eyelid, pupil constriction, and decreased sweating, on the side of your surgery  · All of these side effects are normal and will resolve when the local anesthetic wears off   Prevent Injury  · Protect the limb like a baby  · Beware of exposing your limb to extreme heat or cold or trauma  · The limb may be injured without you noticing because it is numb  · Keep the limb elevated whenever possible  · Do not sleep on the limb  · Change the position of the limb regularly  · Avoid putting pressure on your surgical limb  Pain Control  · The initial block on the day of surgery will make your extremity feel \"numb\"  · Any consecutive injection including prior to discharge from the hospital will make your extremity feel \"numb\"  · You may feel an aching or burning when the local anesthesia starts to wear off  · Take pain pills as prescribed by your surgeon  · Call your surgeon or anesthesiologist if you do not have adequate pain control    "

## 2022-04-15 NOTE — ANESTHESIA PREPROCEDURE EVALUATION
Case: [id number] Date/Time: 04/15/22 9155    Procedures:       ARTHROSCOPY, SHOULDER - CAPSULAR RELEASE (Right )      MANIPULATION, SHOULDER - REPAIRS AS INDICATED    Pre-op diagnosis: ADHESIVE CAPSULITIS, RIGHT    Location: [facility] OR 05 / SURGERY [facility]    Surgeons: Qi Schaefer M.D.        Dyslipidemia, prior GA without issues.  Denies: MI/CHF/smoking/CVA/DM/CKD      Relevant Problems   No relevant active problems       Physical Exam    Airway   Mallampati: II  TM distance: >3 FB  Neck ROM: full       Cardiovascular - normal exam  Rhythm: regular  Rate: normal  (-) murmur     Dental - normal exam           Pulmonary - normal exam  Breath sounds clear to auscultation     Abdominal    Neurological - normal exam                 Anesthesia Plan    ASA 2       Plan - general and peripheral nerve block     Peripheral nerve block will be post-op pain control  Airway plan will be LMA          Induction: intravenous    Postoperative Plan: Postoperative administration of opioids is intended.    Pertinent diagnostic labs and testing reviewed    Informed Consent:    Anesthetic plan and risks discussed with patient.    Use of blood products discussed with: patient whom consented to blood products.

## 2022-04-15 NOTE — ANESTHESIA PROCEDURE NOTES
Peripheral Block    Date/Time: 4/15/2022 12:26 PM  Performed by: Husam Olvera M.D.  Authorized by: Husam Olvera M.D.     Patient Location:  Pre-op  Start Time:  4/15/2022 12:26 PM  End Time:  4/15/2022 12:29 PM  Reason for Block: at surgeon's request and post-op pain management ONLY    patient identified, IV checked, site marked, risks and benefits discussed, surgical consent, monitors and equipment checked, pre-op evaluation and timeout performed    Patient Position:  Sitting  Prep: ChloraPrep    Monitoring:  Heart rate, continuous pulse ox and cardiac monitor  Block Region:  Upper Extremity  Upper Extremity - Block Type:  BRACHIAL PLEXUS block, Interscalene approach    Laterality:  Right  Procedures: ultrasound guided  Image captured, interpreted and electronically stored.  Local Infiltration:  Lidocaine  Strength:  1 %  Dose:  3 ml  Block Type:  Single-shot  Needle Length:  100mm  Needle Gauge:  21 G  Needle Localization:  Ultrasound guidance  Injection Assessment:  Negative aspiration for heme, no paresthesia on injection, incremental injection and local visualized surrounding nerve on ultrasound  Evidence of intravascular injection: No     US Guided Interscalene Brachial Plexus Block   US transducer placed on the neck in oblique plane approximately at the level of C6.  Anterior and Middle Scalene (MSM) muscles identified with nerve trunks identified between the muscles.  Needle inserted lateral to probe and advanced under direct visualization through the MSM into a perineural position.  After negative aspiration, LA injected with ease and visualized surrounding the nerve trunks.

## 2022-04-16 NOTE — ANESTHESIA TIME REPORT
Anesthesia Start and Stop Event Times     Date Time Event    4/15/2022 1138 Ready for Procedure     1237 Anesthesia Start     1327 Anesthesia Stop        Responsible Staff  04/15/22    Name Role Begin End    Husam Olvera M.D. Anesth 1237 1327        Overtime Reason:  no overtime (within assigned shift)    Comments:

## 2022-04-16 NOTE — ANESTHESIA POSTPROCEDURE EVALUATION
Patient: Donovan Galeas    Procedure Summary     Date: 04/15/22 Room / Location:  OR  / SURGERY [facility]    Anesthesia Start: 1237 Anesthesia Stop: 1327    Procedures:       ARTHROSCOPY, SHOULDER - CAPSULAR RELEASE (Right Shoulder)      MANIPULATION, SHOULDER - REPAIRS AS INDICATED (Right Shoulder)      BURSECTOMY (Right Shoulder) Diagnosis: (ADHESIVE CAPSULITIS, RIGHT)    Surgeons: Qi Schaefer M.D. Responsible Provider: Husam Olvera M.D.    Anesthesia Type: general, peripheral nerve block ASA Status: 2          Final Anesthesia Type: general, peripheral nerve block  Last vitals  BP   Blood Pressure: 134/93    Temp   36.5 °C (97.7 °F)    Pulse   76   Resp   16    SpO2   94 %      Anesthesia Post Evaluation    Patient location during evaluation: PACU  Patient participation: complete - patient participated  Level of consciousness: awake and alert  Pain score: 3    Airway patency: patent  Anesthetic complications: no  Cardiovascular status: hemodynamically stable  Respiratory status: acceptable  Hydration status: euvolemic    PONV: none          No complications documented.     Nurse Pain Score: 4 (NPRS)

## 2022-12-04 ENCOUNTER — OFFICE VISIT (OUTPATIENT)
Dept: URGENT CARE | Facility: PHYSICIAN GROUP | Age: 47
End: 2022-12-04
Payer: COMMERCIAL

## 2022-12-04 VITALS
HEIGHT: 72 IN | OXYGEN SATURATION: 98 % | WEIGHT: 204 LBS | RESPIRATION RATE: 14 BRPM | HEART RATE: 78 BPM | TEMPERATURE: 97.8 F | DIASTOLIC BLOOD PRESSURE: 98 MMHG | BODY MASS INDEX: 27.63 KG/M2 | SYSTOLIC BLOOD PRESSURE: 140 MMHG

## 2022-12-04 DIAGNOSIS — J20.9 ACUTE BRONCHITIS, UNSPECIFIED ORGANISM: ICD-10-CM

## 2022-12-04 DIAGNOSIS — R05.1 ACUTE COUGH: ICD-10-CM

## 2022-12-04 PROBLEM — M75.01 ADHESIVE CAPSULITIS OF RIGHT SHOULDER: Status: ACTIVE | Noted: 2022-10-05

## 2022-12-04 PROCEDURE — 99213 OFFICE O/P EST LOW 20 MIN: CPT

## 2022-12-04 RX ORDER — BENZONATATE 100 MG/1
100 CAPSULE ORAL 2 TIMES DAILY PRN
Qty: 20 CAPSULE | Refills: 0 | Status: SHIPPED | OUTPATIENT
Start: 2022-12-04

## 2022-12-04 RX ORDER — PREDNISONE 10 MG/1
20 TABLET ORAL DAILY
Qty: 10 TABLET | Refills: 0 | Status: SHIPPED | OUTPATIENT
Start: 2022-12-04 | End: 2022-12-09

## 2022-12-04 ASSESSMENT — ENCOUNTER SYMPTOMS
DIARRHEA: 0
SPUTUM PRODUCTION: 1
VOMITING: 0
SHORTNESS OF BREATH: 0
COUGH: 1
WHEEZING: 0
SORE THROAT: 1
FEVER: 0
CHILLS: 0

## 2022-12-05 NOTE — PROGRESS NOTES
Subjective     Donovan Galeas is a 47 y.o. male who presents with Cough (Sore throat, congestion, cough nonstop. X5 days)            HPI    Patient presents with symptoms that started 5 days ago. He reports fatigue, rhinorrhea and nasal congestion. He further endorses cough productive of thick green sputum. He reports cough to be worse at night and often keeps him up, complains of not being able to sleep. He also notes chest congestion. He denies any shortness of breath. He has not taken any medications for his symptoms. He declined being COVID vaccinated. He reports his whole family to be sick .     Patient's current problem list, medications, and past medical/surgical history were reviewed in Epic.    PMH:  has a past medical history of COVID-19 (01/06/2022), Disorder of thyroid, High cholesterol, Plantar fasciitis, and Seasonal allergies.  MEDS:   Current Outpatient Medications:     aspirin EC (ECOTRIN) 81 MG Tablet Delayed Response, Take 81 mg by mouth every day., Disp: , Rfl:     GLUCOSAMINE-CHONDROITIN PO, Take 2 Capsules by mouth every day., Disp: , Rfl:     Multiple Vitamins-Minerals (ALIVE MULTI-VITAMIN PO), Take 1 Tablet by mouth every day., Disp: , Rfl:     MAGNESIUM GLYCINATE PO, Take 1,000 mg by mouth every day., Disp: , Rfl:     Cholecalciferol (VITAMIN D3) 125 MCG (5000 UT) Cap, Take 1 Capsule by mouth every day., Disp: , Rfl:     testosterone cypionate (DEPO-TESTOSTERONE) 200 MG/ML Solution injection, 150 mg by Intramuscular route Once., Disp: , Rfl:     fenofibrate (TRIGLIDE) 160 MG tablet, Take 160 mg by mouth every day., Disp: , Rfl:     Non Formulary Request, Take 1 Capsule by mouth every day. YES-oil supplement cap, Disp: , Rfl:   ALLERGIES:   Allergies   Allergen Reactions    Seasonal Runny Nose and Itching     Sneezing.  Had allergy testing done.     SURGHX:   Past Surgical History:   Procedure Laterality Date    PB SHLDR ARTHROSCOP,DIAGNOSTIC Right 4/15/2022    Procedure: ARTHROSCOPY,  SHOULDER - CAPSULAR RELEASE;  Surgeon: Qi Schaefer M.D.;  Location: SURGERY [facility];  Service: Orthopedics    PB MANIPULATN SHLDR JT W ANESTHESIA Right 4/15/2022    Procedure: MANIPULATION, SHOULDER - REPAIRS AS INDICATED;  Surgeon: Qi Schaefer M.D.;  Location: [facility];  Service: Orthopedics    BURSA EXCISION Right 4/15/2022    Procedure: BURSECTOMY;  Surgeon: Qi Schaefer M.D.;  Location: SURGERY [facility];  Service: Orthopedics    SHOULDER ARTHROSCOPY Right 12/02/2021    TONSILLECTOMY AND ADENOIDECTOMY Bilateral 10/26/2018    Procedure: TONSILLECTOMY AND ADENOIDECTOMY;  Surgeon: Rochelle Sy M.D.;  Location: [facility];  Service: Ent    DESTRUCT,NEUROLYTIC AGNT,OTHER  2012    T5-T8 nerve ablation    RELEASE PLANTAR FASCIA Bilateral 2006    DENTAL EXTRACTION(S)  1993    wisdom teeth     SOCHX:  reports that he has quit smoking. His smoking use included cigars and cigarettes. He has never used smokeless tobacco. He reports that he does not currently use alcohol. He reports that he does not currently use drugs after having used the following drugs: Oral.  FH: Reviewed with patient, not pertinent to this visit.       Review of Systems   Constitutional:  Positive for malaise/fatigue. Negative for chills and fever.   HENT:  Positive for congestion and sore throat.         Rhinorrhea      Respiratory:  Positive for cough and sputum production. Negative for shortness of breath and wheezing.    Gastrointestinal:  Negative for diarrhea and vomiting.            Objective     BP (!) 140/98   Pulse 78   Temp 36.6 °C (97.8 °F)   Resp 14   Ht 1.829 m (6')   Wt 92.5 kg (204 lb)   SpO2 98%   BMI 27.67 kg/m²      Physical Exam  Constitutional:       Appearance: Normal appearance.   HENT:      Head: Normocephalic.      Nose: Congestion present.   Eyes:      Extraocular Movements: Extraocular movements intact.   Cardiovascular:      Rate and Rhythm: Normal rate and  regular rhythm.      Pulses: Normal pulses.      Heart sounds: Normal heart sounds.   Pulmonary:      Effort: Pulmonary effort is normal.      Breath sounds: Normal breath sounds. Decreased air movement present.   Musculoskeletal:         General: Normal range of motion.      Cervical back: Normal range of motion.   Skin:     General: Skin is warm.   Neurological:      General: No focal deficit present.      Mental Status: He is alert.   Psychiatric:         Mood and Affect: Mood normal.         Behavior: Behavior normal.         Assessment & Plan        1. Acute bronchitis, unspecified organism    - benzonatate (TESSALON) 100 MG Cap; Take 1 Capsule by mouth 2 times a day as needed for Cough.  Dispense: 20 Capsule; Refill: 0  - predniSONE (DELTASONE) 10 MG Tab; Take 2 Tablets by mouth every day for 5 days.  Dispense: 10 Tablet; Refill: 0    2. Acute cough    - benzonatate (TESSALON) 100 MG Cap; Take 1 Capsule by mouth 2 times a day as needed for Cough.  Dispense: 20 Capsule; Refill: 0    Patient's presentation is consistent with acute bronchitis. He is prescribed prednisone daily for 5 days. He is educated on side effects, recommended taking this with food. May take Tessalon Perles twice daily as needed for cough. Discussed treatment plan with patient, she is agreeable and verbalized understanding.  Educated patient on signs and symptoms watch out for, when to return to the clinic or go to the ER.    Recommended supportive treatment at home:  OTC Tylenol or Motrin for fever/discomfort.  OTC supportive care for nasal congestion - saline nasal spray/Flonase nasal spray and/or netipot  Humidifier and steam inhalation/warm showers.  Increase oral fluid intake.    Electronically Signed by JOSE DAVID Brown

## (undated) DEVICE — GLOVE SURGICAL PROTEXIS PI 8.0 LF - (50PR/BX)

## (undated) DEVICE — ELECTRODE DUAL RETURN W/ CORD - (50/PK)

## (undated) DEVICE — KIT ROOM DECONTAMINATION

## (undated) DEVICE — CANISTER SUCTION RIGID RED 1500CC (40EA/CA)

## (undated) DEVICE — NEEDLE NON SAFETY 27GA X 1-1/4 IN HYPO (100EA/BX)

## (undated) DEVICE — GLOVE BIOGEL SZ 7 SURGICAL PF LTX - (50PR/BX 4BX/CA)

## (undated) DEVICE — SUTURE 3-0 VICRYL PLUS SH - 27 INCH (36/BX)

## (undated) DEVICE — CHLORAPREP 26 ML APPLICATOR - ORANGE TINT(25/CA)

## (undated) DEVICE — GLOVE BIOGEL PI ULTRATOUCH SZ 7.0 SURGICAL PF LF- POWDER FREE (50/BX 4BX/CA)

## (undated) DEVICE — HEAD HOLDER JUNIOR/ADULT

## (undated) DEVICE — SUCTION INSTRUMENT YANKAUER BULBOUS TIP W/O VENT (50EA/CA)

## (undated) DEVICE — GLOVE 7.0 LF PF PROTEXIS (50PR/BX)

## (undated) DEVICE — TUBING DAY USE W/CARTRIDGE (10EA/BX)

## (undated) DEVICE — WATER IRRIGATION STERILE 1000ML (12EA/CA)

## (undated) DEVICE — PROTECTOR ULNA NERVE - (36PR/CA)

## (undated) DEVICE — TUBING CASSETTE CROSSFLOW INTEGRATED (10EA/CA)

## (undated) DEVICE — HUMID-VENT HEAT AND MOISTURE EXCHANGE- (50/BX)

## (undated) DEVICE — SHAVER4.0 AGGRESSIVE + FORMLA (5EA/BX)

## (undated) DEVICE — BAG SPONGE COUNT 10.25 X 32 - BLUE (250/CA)

## (undated) DEVICE — CANNULA THREADED 5X75 (5EA/BX)

## (undated) DEVICE — DRAPE U SPLIT IMP 54 X 76 - (24/CA)

## (undated) DEVICE — SHAVER, 5.5 RESECTOR

## (undated) DEVICE — TUBE CONNECTING SUCTION - CLEAR PLASTIC STERILE 72 IN (50EA/CA)

## (undated) DEVICE — DRAPE IOBAN II INCISE 23X17 - (10EA/BX 4BX/CA)

## (undated) DEVICE — PACK MINOR BASIN - (2EA/CA)

## (undated) DEVICE — SUTURE 3-0 ETHILON FS-1 - (36/BX) 30 INCH

## (undated) DEVICE — MASK ANESTHESIA ADULT  - (100/CA)

## (undated) DEVICE — TUBE SHILEY ENDOTRACHEAL ORAL RAE CUFFED 8.0MM WITH TAPERGUARD (10EA/BX)

## (undated) DEVICE — SPONGE TONSIL LARGE XRAY STERILE - (5/PK 20PK/CA)

## (undated) DEVICE — LACTATED RINGERS INJ 1000 ML - (14EA/CA 60CA/PF)

## (undated) DEVICE — SENSOR SPO2 NEO LNCS ADHESIVE (20/BX) SEE USER NOTES

## (undated) DEVICE — SYRINGE ASEPTO - (50EA/CA

## (undated) DEVICE — ELECTRODE 850 FOAM ADHESIVE - HYDROGEL RADIOTRNSPRNT (50/PK)

## (undated) DEVICE — BAG, SPONGE COUNT 50600

## (undated) DEVICE — CIRCUIT VENTILATOR PEDIATRIC WITH FILTER  (20EA/CS)

## (undated) DEVICE — GLOVE BIOGEL PI INDICATOR SZ 6.5 SURGICAL PF LF - (50/BX 4BX/CA)

## (undated) DEVICE — GLOVE, LITE (PAIR)

## (undated) DEVICE — SYRINGE 10 ML CONTROL LL (25EA/BX 4BX/CA)

## (undated) DEVICE — SUTURE GENERAL

## (undated) DEVICE — GOWN WARMING STANDARD FLEX - (30/CA)

## (undated) DEVICE — SLEEVE SHOULDER DISP(ARTHREX) - (6/BX)

## (undated) DEVICE — BOVIE BLADE COATED &INSULATED - 25/PK

## (undated) DEVICE — TOWEL STOP TIMEOUT SAFETY FLAG (40EA/CA)

## (undated) DEVICE — ANTI-FOG SOLUTION - 60BTL/CA

## (undated) DEVICE — SODIUM CHL. IRRIGATION 0.9% 3000ML (4EA/CA 65CA/PF)

## (undated) DEVICE — DRAPE SHOULDER FLUID CONTROL - 77 X 85 (10/CA)

## (undated) DEVICE — KIT ANESTHESIA W/CIRCUIT & 3/LT BAG W/FILTER (20EA/CA)

## (undated) DEVICE — NEPTUNE 4 PORT MANIFOLD - (20/PK)

## (undated) DEVICE — GOWN SURGICAL X-LARGE ULTRA - FILM-REINFORCED (20/CA)

## (undated) DEVICE — DRAPE LARGE 3 QUARTER - (20/CA)

## (undated) DEVICE — BOVIE FOOT CONTROL SUCTION - 6IN 10FR (25EA/CA)

## (undated) DEVICE — GLOVE BIOGEL ECLIPSE PF LATEX SIZE 8.0  (50PR/BX)

## (undated) DEVICE — MASK, LARYNGEAL AIRWAY #4

## (undated) DEVICE — SODIUM CHL IRRIGATION 0.9% 1000ML (12EA/CA)

## (undated) DEVICE — NEEDLE W/FACET TIP DULL VERSION W/STIMULATION CABLE SONOPLEX 21G X 4 (10/EA)"

## (undated) DEVICE — CATHETER FOLEY ROBINSON 10FR 16IN STRL (12EA/CA)

## (undated) DEVICE — ABLATOR WAND SERFAS 90-S CRUISE